# Patient Record
Sex: MALE | Race: WHITE | NOT HISPANIC OR LATINO | URBAN - METROPOLITAN AREA
[De-identification: names, ages, dates, MRNs, and addresses within clinical notes are randomized per-mention and may not be internally consistent; named-entity substitution may affect disease eponyms.]

---

## 2017-12-09 ENCOUNTER — INPATIENT (INPATIENT)
Facility: HOSPITAL | Age: 82
LOS: 5 days | Discharge: NURSING, REHAB OR LONG-TERM CARE | DRG: 177 | End: 2017-12-15
Attending: INTERNAL MEDICINE | Admitting: INTERNAL MEDICINE
Payer: MEDICARE

## 2017-12-09 VITALS
SYSTOLIC BLOOD PRESSURE: 151 MMHG | DIASTOLIC BLOOD PRESSURE: 74 MMHG | OXYGEN SATURATION: 98 % | WEIGHT: 134.26 LBS | HEART RATE: 93 BPM | RESPIRATION RATE: 24 BRPM | TEMPERATURE: 99 F

## 2017-12-09 DIAGNOSIS — G91.1 OBSTRUCTIVE HYDROCEPHALUS: ICD-10-CM

## 2017-12-09 DIAGNOSIS — R63.8 OTHER SYMPTOMS AND SIGNS CONCERNING FOOD AND FLUID INTAKE: ICD-10-CM

## 2017-12-09 DIAGNOSIS — R50.9 FEVER, UNSPECIFIED: ICD-10-CM

## 2017-12-09 DIAGNOSIS — Z98.89 OTHER SPECIFIED POSTPROCEDURAL STATES: Chronic | ICD-10-CM

## 2017-12-09 DIAGNOSIS — Z29.9 ENCOUNTER FOR PROPHYLACTIC MEASURES, UNSPECIFIED: ICD-10-CM

## 2017-12-09 DIAGNOSIS — E11.9 TYPE 2 DIABETES MELLITUS WITHOUT COMPLICATIONS: ICD-10-CM

## 2017-12-09 DIAGNOSIS — E03.9 HYPOTHYROIDISM, UNSPECIFIED: ICD-10-CM

## 2017-12-09 DIAGNOSIS — E78.5 HYPERLIPIDEMIA, UNSPECIFIED: ICD-10-CM

## 2017-12-09 DIAGNOSIS — I10 ESSENTIAL (PRIMARY) HYPERTENSION: ICD-10-CM

## 2017-12-09 DIAGNOSIS — Z93.59 OTHER CYSTOSTOMY STATUS: ICD-10-CM

## 2017-12-09 LAB
ALBUMIN SERPL ELPH-MCNC: 3.2 G/DL — LOW (ref 3.3–5)
ALP SERPL-CCNC: 111 U/L — SIGNIFICANT CHANGE UP (ref 40–120)
ALT FLD-CCNC: 45 U/L — SIGNIFICANT CHANGE UP (ref 10–45)
ANION GAP SERPL CALC-SCNC: 14 MMOL/L — SIGNIFICANT CHANGE UP (ref 5–17)
ANION GAP SERPL CALC-SCNC: 14 MMOL/L — SIGNIFICANT CHANGE UP (ref 5–17)
APPEARANCE UR: CLEAR — SIGNIFICANT CHANGE UP
APTT BLD: 28.5 SEC — SIGNIFICANT CHANGE UP (ref 27.5–37.4)
AST SERPL-CCNC: 53 U/L — HIGH (ref 10–40)
BASE EXCESS BLDV CALC-SCNC: -3.1 MMOL/L — SIGNIFICANT CHANGE UP
BASOPHILS NFR BLD AUTO: 0.1 % — SIGNIFICANT CHANGE UP (ref 0–2)
BILIRUB SERPL-MCNC: 0.5 MG/DL — SIGNIFICANT CHANGE UP (ref 0.2–1.2)
BILIRUB UR-MCNC: NEGATIVE — SIGNIFICANT CHANGE UP
BLD GP AB SCN SERPL QL: NEGATIVE — SIGNIFICANT CHANGE UP
BUN SERPL-MCNC: 13 MG/DL — SIGNIFICANT CHANGE UP (ref 7–23)
BUN SERPL-MCNC: 14 MG/DL — SIGNIFICANT CHANGE UP (ref 7–23)
CALCIUM SERPL-MCNC: 7.7 MG/DL — LOW (ref 8.4–10.5)
CALCIUM SERPL-MCNC: 8.1 MG/DL — LOW (ref 8.4–10.5)
CHLORIDE SERPL-SCNC: 104 MMOL/L — SIGNIFICANT CHANGE UP (ref 96–108)
CHLORIDE SERPL-SCNC: 109 MMOL/L — HIGH (ref 96–108)
CO2 SERPL-SCNC: 17 MMOL/L — LOW (ref 22–31)
CO2 SERPL-SCNC: 20 MMOL/L — LOW (ref 22–31)
COLOR SPEC: YELLOW — SIGNIFICANT CHANGE UP
CREAT SERPL-MCNC: 1.46 MG/DL — HIGH (ref 0.5–1.3)
CREAT SERPL-MCNC: 1.58 MG/DL — HIGH (ref 0.5–1.3)
DIFF PNL FLD: (no result)
EOSINOPHIL NFR BLD AUTO: 0.9 % — SIGNIFICANT CHANGE UP (ref 0–6)
GAS PNL BLDV: SIGNIFICANT CHANGE UP
GLUCOSE BLDC GLUCOMTR-MCNC: 207 MG/DL — HIGH (ref 70–99)
GLUCOSE BLDC GLUCOMTR-MCNC: 225 MG/DL — HIGH (ref 70–99)
GLUCOSE SERPL-MCNC: 130 MG/DL — HIGH (ref 70–99)
GLUCOSE SERPL-MCNC: 196 MG/DL — HIGH (ref 70–99)
GLUCOSE UR QL: NEGATIVE — SIGNIFICANT CHANGE UP
HCO3 BLDV-SCNC: 20 MMOL/L — SIGNIFICANT CHANGE UP (ref 20–27)
HCT VFR BLD CALC: 32.7 % — LOW (ref 39–50)
HGB BLD-MCNC: 10.8 G/DL — LOW (ref 13–17)
INR BLD: 1.26 — HIGH (ref 0.88–1.16)
KETONES UR-MCNC: NEGATIVE — SIGNIFICANT CHANGE UP
LACTATE SERPL-SCNC: 1.7 MMOL/L — SIGNIFICANT CHANGE UP (ref 0.5–2)
LEUKOCYTE ESTERASE UR-ACNC: (no result)
LIDOCAIN IGE QN: 25 U/L — SIGNIFICANT CHANGE UP (ref 7–60)
LYMPHOCYTES # BLD AUTO: 7.9 % — LOW (ref 13–44)
MCHC RBC-ENTMCNC: 28.6 PG — SIGNIFICANT CHANGE UP (ref 27–34)
MCHC RBC-ENTMCNC: 33 G/DL — SIGNIFICANT CHANGE UP (ref 32–36)
MCV RBC AUTO: 86.7 FL — SIGNIFICANT CHANGE UP (ref 80–100)
MONOCYTES NFR BLD AUTO: 2.4 % — SIGNIFICANT CHANGE UP (ref 2–14)
NEUTROPHILS NFR BLD AUTO: 88.7 % — HIGH (ref 43–77)
NITRITE UR-MCNC: NEGATIVE — SIGNIFICANT CHANGE UP
PCO2 BLDV: 30 MMHG — LOW (ref 41–51)
PH BLDV: 7.44 — HIGH (ref 7.32–7.43)
PH UR: 6.5 — SIGNIFICANT CHANGE UP (ref 5–8)
PLATELET # BLD AUTO: 267 K/UL — SIGNIFICANT CHANGE UP (ref 150–400)
PO2 BLDV: 39 MMHG — SIGNIFICANT CHANGE UP
POTASSIUM SERPL-MCNC: 3.6 MMOL/L — SIGNIFICANT CHANGE UP (ref 3.5–5.3)
POTASSIUM SERPL-MCNC: 3.8 MMOL/L — SIGNIFICANT CHANGE UP (ref 3.5–5.3)
POTASSIUM SERPL-SCNC: 3.6 MMOL/L — SIGNIFICANT CHANGE UP (ref 3.5–5.3)
POTASSIUM SERPL-SCNC: 3.8 MMOL/L — SIGNIFICANT CHANGE UP (ref 3.5–5.3)
PROT SERPL-MCNC: 7.3 G/DL — SIGNIFICANT CHANGE UP (ref 6–8.3)
PROT UR-MCNC: 30 MG/DL
PROTHROM AB SERPL-ACNC: 14 SEC — HIGH (ref 9.8–12.7)
RAPID RVP RESULT: SIGNIFICANT CHANGE UP
RBC # BLD: 3.77 M/UL — LOW (ref 4.2–5.8)
RBC # FLD: 14 % — SIGNIFICANT CHANGE UP (ref 10.3–16.9)
RH IG SCN BLD-IMP: POSITIVE — SIGNIFICANT CHANGE UP
SAO2 % BLDV: 71 % — SIGNIFICANT CHANGE UP
SODIUM SERPL-SCNC: 138 MMOL/L — SIGNIFICANT CHANGE UP (ref 135–145)
SODIUM SERPL-SCNC: 140 MMOL/L — SIGNIFICANT CHANGE UP (ref 135–145)
SP GR SPEC: 1.01 — SIGNIFICANT CHANGE UP (ref 1–1.03)
UROBILINOGEN FLD QL: 0.2 E.U./DL — SIGNIFICANT CHANGE UP
WBC # BLD: 9.7 K/UL — SIGNIFICANT CHANGE UP (ref 3.8–10.5)
WBC # FLD AUTO: 9.7 K/UL — SIGNIFICANT CHANGE UP (ref 3.8–10.5)

## 2017-12-09 PROCEDURE — 99222 1ST HOSP IP/OBS MODERATE 55: CPT | Mod: GC

## 2017-12-09 PROCEDURE — 71010: CPT | Mod: 26

## 2017-12-09 PROCEDURE — 93010 ELECTROCARDIOGRAM REPORT: CPT

## 2017-12-09 PROCEDURE — 99285 EMERGENCY DEPT VISIT HI MDM: CPT | Mod: 25

## 2017-12-09 RX ORDER — SODIUM CHLORIDE 9 MG/ML
1000 INJECTION INTRAMUSCULAR; INTRAVENOUS; SUBCUTANEOUS
Qty: 0 | Refills: 0 | Status: DISCONTINUED | OUTPATIENT
Start: 2017-12-09 | End: 2017-12-14

## 2017-12-09 RX ORDER — VANCOMYCIN HCL 1 G
1000 VIAL (EA) INTRAVENOUS ONCE
Qty: 0 | Refills: 0 | Status: COMPLETED | OUTPATIENT
Start: 2017-12-09 | End: 2017-12-09

## 2017-12-09 RX ORDER — ATORVASTATIN CALCIUM 80 MG/1
10 TABLET, FILM COATED ORAL AT BEDTIME
Qty: 0 | Refills: 0 | Status: DISCONTINUED | OUTPATIENT
Start: 2017-12-09 | End: 2017-12-15

## 2017-12-09 RX ORDER — LEVETIRACETAM 250 MG/1
500 TABLET, FILM COATED ORAL
Qty: 0 | Refills: 0 | Status: DISCONTINUED | OUTPATIENT
Start: 2017-12-09 | End: 2017-12-11

## 2017-12-09 RX ORDER — HEPARIN SODIUM 5000 [USP'U]/ML
5000 INJECTION INTRAVENOUS; SUBCUTANEOUS EVERY 8 HOURS
Qty: 0 | Refills: 0 | Status: DISCONTINUED | OUTPATIENT
Start: 2017-12-09 | End: 2017-12-15

## 2017-12-09 RX ORDER — LEVOTHYROXINE SODIUM 125 MCG
25 TABLET ORAL DAILY
Qty: 0 | Refills: 0 | Status: DISCONTINUED | OUTPATIENT
Start: 2017-12-09 | End: 2017-12-09

## 2017-12-09 RX ORDER — KETOROLAC TROMETHAMINE 30 MG/ML
15 SYRINGE (ML) INJECTION ONCE
Qty: 0 | Refills: 0 | Status: DISCONTINUED | OUTPATIENT
Start: 2017-12-09 | End: 2017-12-09

## 2017-12-09 RX ORDER — METOPROLOL TARTRATE 50 MG
100 TABLET ORAL
Qty: 0 | Refills: 0 | Status: DISCONTINUED | OUTPATIENT
Start: 2017-12-09 | End: 2017-12-11

## 2017-12-09 RX ORDER — SODIUM CHLORIDE 9 MG/ML
500 INJECTION INTRAMUSCULAR; INTRAVENOUS; SUBCUTANEOUS
Qty: 0 | Refills: 0 | Status: COMPLETED | OUTPATIENT
Start: 2017-12-09 | End: 2017-12-09

## 2017-12-09 RX ORDER — DEXTROSE 50 % IN WATER 50 %
25 SYRINGE (ML) INTRAVENOUS ONCE
Qty: 0 | Refills: 0 | Status: DISCONTINUED | OUTPATIENT
Start: 2017-12-09 | End: 2017-12-15

## 2017-12-09 RX ORDER — PIPERACILLIN AND TAZOBACTAM 4; .5 G/20ML; G/20ML
3.38 INJECTION, POWDER, LYOPHILIZED, FOR SOLUTION INTRAVENOUS ONCE
Qty: 0 | Refills: 0 | Status: COMPLETED | OUTPATIENT
Start: 2017-12-09 | End: 2017-12-09

## 2017-12-09 RX ORDER — ACETAMINOPHEN 500 MG
650 TABLET ORAL EVERY 6 HOURS
Qty: 0 | Refills: 0 | Status: DISCONTINUED | OUTPATIENT
Start: 2017-12-09 | End: 2017-12-15

## 2017-12-09 RX ORDER — DEXTROSE 50 % IN WATER 50 %
1 SYRINGE (ML) INTRAVENOUS ONCE
Qty: 0 | Refills: 0 | Status: DISCONTINUED | OUTPATIENT
Start: 2017-12-09 | End: 2017-12-15

## 2017-12-09 RX ORDER — SODIUM CHLORIDE 9 MG/ML
1000 INJECTION, SOLUTION INTRAVENOUS
Qty: 0 | Refills: 0 | Status: DISCONTINUED | OUTPATIENT
Start: 2017-12-09 | End: 2017-12-15

## 2017-12-09 RX ORDER — GLUCAGON INJECTION, SOLUTION 0.5 MG/.1ML
1 INJECTION, SOLUTION SUBCUTANEOUS ONCE
Qty: 0 | Refills: 0 | Status: DISCONTINUED | OUTPATIENT
Start: 2017-12-09 | End: 2017-12-15

## 2017-12-09 RX ORDER — INSULIN LISPRO 100/ML
VIAL (ML) SUBCUTANEOUS
Qty: 0 | Refills: 0 | Status: DISCONTINUED | OUTPATIENT
Start: 2017-12-09 | End: 2017-12-15

## 2017-12-09 RX ORDER — AMLODIPINE BESYLATE 2.5 MG/1
5 TABLET ORAL DAILY
Qty: 0 | Refills: 0 | Status: DISCONTINUED | OUTPATIENT
Start: 2017-12-09 | End: 2017-12-15

## 2017-12-09 RX ORDER — ACETAMINOPHEN 500 MG
650 TABLET ORAL ONCE
Qty: 0 | Refills: 0 | Status: COMPLETED | OUTPATIENT
Start: 2017-12-09 | End: 2017-12-09

## 2017-12-09 RX ORDER — DEXTROSE 50 % IN WATER 50 %
12.5 SYRINGE (ML) INTRAVENOUS ONCE
Qty: 0 | Refills: 0 | Status: DISCONTINUED | OUTPATIENT
Start: 2017-12-09 | End: 2017-12-15

## 2017-12-09 RX ORDER — ASPIRIN/CALCIUM CARB/MAGNESIUM 324 MG
81 TABLET ORAL DAILY
Qty: 0 | Refills: 0 | Status: DISCONTINUED | OUTPATIENT
Start: 2017-12-09 | End: 2017-12-15

## 2017-12-09 RX ORDER — LEVOTHYROXINE SODIUM 125 MCG
50 TABLET ORAL DAILY
Qty: 0 | Refills: 0 | Status: DISCONTINUED | OUTPATIENT
Start: 2017-12-09 | End: 2017-12-11

## 2017-12-09 RX ADMIN — SODIUM CHLORIDE 2000 MILLILITER(S): 9 INJECTION INTRAMUSCULAR; INTRAVENOUS; SUBCUTANEOUS at 12:40

## 2017-12-09 RX ADMIN — SODIUM CHLORIDE 2000 MILLILITER(S): 9 INJECTION INTRAMUSCULAR; INTRAVENOUS; SUBCUTANEOUS at 13:00

## 2017-12-09 RX ADMIN — SODIUM CHLORIDE 2000 MILLILITER(S): 9 INJECTION INTRAMUSCULAR; INTRAVENOUS; SUBCUTANEOUS at 12:30

## 2017-12-09 RX ADMIN — SODIUM CHLORIDE 75 MILLILITER(S): 9 INJECTION INTRAMUSCULAR; INTRAVENOUS; SUBCUTANEOUS at 15:46

## 2017-12-09 RX ADMIN — SODIUM CHLORIDE 2000 MILLILITER(S): 9 INJECTION INTRAMUSCULAR; INTRAVENOUS; SUBCUTANEOUS at 12:50

## 2017-12-09 RX ADMIN — AMLODIPINE BESYLATE 5 MILLIGRAM(S): 2.5 TABLET ORAL at 20:12

## 2017-12-09 RX ADMIN — Medication: at 22:09

## 2017-12-09 RX ADMIN — Medication 250 MILLIGRAM(S): at 13:20

## 2017-12-09 RX ADMIN — Medication 15 MILLIGRAM(S): at 12:52

## 2017-12-09 RX ADMIN — PIPERACILLIN AND TAZOBACTAM 200 GRAM(S): 4; .5 INJECTION, POWDER, LYOPHILIZED, FOR SOLUTION INTRAVENOUS at 12:52

## 2017-12-09 RX ADMIN — ATORVASTATIN CALCIUM 10 MILLIGRAM(S): 80 TABLET, FILM COATED ORAL at 22:09

## 2017-12-09 RX ADMIN — Medication 650 MILLIGRAM(S): at 12:52

## 2017-12-09 RX ADMIN — LEVETIRACETAM 500 MILLIGRAM(S): 250 TABLET, FILM COATED ORAL at 20:12

## 2017-12-09 RX ADMIN — HEPARIN SODIUM 5000 UNIT(S): 5000 INJECTION INTRAVENOUS; SUBCUTANEOUS at 22:09

## 2017-12-09 RX ADMIN — Medication 81 MILLIGRAM(S): at 20:12

## 2017-12-09 RX ADMIN — Medication 100 MILLIGRAM(S): at 22:10

## 2017-12-09 NOTE — H&P ADULT - PROBLEM SELECTOR PLAN 2
Pt febrile in the ED to 104. Did not meet SIRS criteria. UA was positive but given pt w/ chronic suprapubic catheter and hx of positive UA on previous admission will not rule out other etiologies. Concern for possible aspiration pneumonia given pt w/ episode of coffee ground emesis and diffuse rhonchi on lung exam.   -f/u blood cultures  -f/u urine cultures  -f/u RVP Pt febrile in the ED to 104. Did not meet SIRS criteria. UA was positive but given pt w/ chronic suprapubic catheter and hx of positive UA on previous admission will not rule out other etiologies. Concern for possible aspiration pneumonia given pt w/ episode of coffee ground emesis and diffuse rhonchi on lung exam.   -f/u blood cultures  -f/u urine cultures  -f/u RVP  -will continue w/ vanc + zosyn for now Pt febrile in the ED to 104. Did not meet SIRS criteria. UA was positive but given pt w/ chronic suprapubic catheter and hx of positive UA on previous admission will not rule out other etiologies. Concern for possible aspiration pneumonia given pt w/ episode of coffee ground emesis, diffuse rhonchi and gargling noises on exam, and hx of dysphagia.   -f/u blood cultures  -f/u urine cultures  -f/u RVP  -will continue w/ vanc + zosyn for now  -f/u repeat chest xray in AM

## 2017-12-09 NOTE — H&P ADULT - HISTORY OF PRESENT ILLNESS
Patient is a 83 year old male with past medical history of           In the ED Vitals were TMax 104, HR 89-98, -151/70-97, RR 18-24, SpO2 98% on 2L NC. Labs Notable for Anemia (Hemoglobin 10.8), No Leukocytosis but with Elevated PMNs 88.7%. Decreased Bicarbonate 20, Elevated Creatinine 1.58, Mildly Elevated AST, Urinalysis Consistent with UTI. CXR without Acute Infiltrate. No EKG Performed. Patient given Vancomycin 1g IV x 1, Zosyn 3.375g IV x 1., NS 500mL IV x 1. Blood Cultures, Urine Culture Obtained. Patient is a 83 year old male with past medical history of obstructive hydrocephalus, dementia (AAOx0), dysphagia, CKD, urinary retention w/ suprapubic catheter, DM, hypothyroidism, HTN, HLD, seizure disorder who was brought in from NH after having what is reported to be one episode of medium sized coffee ground vomitus and fever. Pt was previously admitted to [facility] in 2016 for concerns of aspiration pneumonia and UTI but found to have contaminated urine cultures and treatment was discontinued. Of note, the patient is nonverbal at baseline.     In the ED Vitals were TMax 104, HR 89-98, -151/70-97, RR 18-24, SpO2 98% on 2L NC. Labs Notable for Anemia (Hemoglobin 10.8), No Leukocytosis but with Elevated PMNs 88.7%. Decreased Bicarbonate 20, Elevated Creatinine 1.58, Mildly Elevated AST, Urinalysis Consistent with UTI. CXR without Acute Infiltrate. No EKG Performed. Patient given Vancomycin 1g IV x 1, Zosyn 3.375g IV x 1., NS 500mL IV x 1. Blood Cultures, Urine Culture Obtained.

## 2017-12-09 NOTE — H&P ADULT - PROBLEM SELECTOR PLAN 3
Pt w/ suprapubic indwelling urinary catheter found to have a positive UA. Pt febrile in the ED to 104 likely secondary to UTI. S/p 1 dose of Vanc + Zosyn in the ED.   - Pt w/ suprapubic indwelling urinary catheter found to have a positive UA. Pt febrile in the ED to 104 likely secondary to UTI. S/p 1 dose of Vanc + Zosyn in the ED.   -c/w suprapubic catheter

## 2017-12-09 NOTE — H&P ADULT - NSHPPHYSICALEXAM_GEN_ALL_CORE
Vital Signs Last 24 Hrs  T(C): 38.6 (09 Dec 2017 14:11), Max: 40 (09 Dec 2017 12:30)  T(F): 101.5 (09 Dec 2017 14:11), Max: 104 (09 Dec 2017 12:30)  HR: 89 (09 Dec 2017 14:11) (89 - 98)  BP: 139/70 (09 Dec 2017 14:11) (139/70 - 151/97)  BP(mean): --  RR: 18 (09 Dec 2017 14:11) (18 - 24)  SpO2: 98% (09 Dec 2017 14:11) (98% - 98%) .  VITAL SIGNS:  T(C): 36.7 (12-09-17 @ 15:10), Max: 40 (12-09-17 @ 12:30)  T(F): 98 (12-09-17 @ 15:10), Max: 104 (12-09-17 @ 12:30)  HR: 86 (12-09-17 @ 15:10) (86 - 98)  BP: 165/97 (12-09-17 @ 15:10) (139/70 - 165/97)  BP(mean): --  RR: 18 (12-09-17 @ 15:10) (18 - 24)  SpO2: 96% (12-09-17 @ 15:10) (96% - 98%)  Wt(kg): --    PHYSICAL EXAM:    Constitutional: Elderly male laying in bed comfortably.   Head: NC/AT  Eyes: PERRL, anicteric sclera, no conjunctival pallor noted.   Neck: supple; no JVD or thyromegaly  Respiratory: Diffuse rhonchi b/l upper and lower lung fields. Pt noted to be making gargling noises. Breathing comfortably. No accessory muscle use.   Cardiac: +S1/S2; RRR; no M/R/G; PMI non-displaced  Gastrointestinal: abdomen soft, NT/ND; no rebound or guarding; +BSx4  Genitourinary: normal external genitalia  Extremities: WWP, no clubbing or cyanosis; no peripheral edema  Vascular: 2+ radial, femoral, DP/PT pulses B/L  Neurologic: AAOx0. Unable to follow commands but pt is moving all 4 extremities spontaneously.

## 2017-12-09 NOTE — H&P ADULT - ATTENDING COMMENTS
Patient seen and examined, I agree with the above assessment and plan.     Patient is a 83 year old male with past medical history of obstructive hydrocephalus, dementia (AAOx0), dysphagia, CKD, urinary retention w/ suprapubic catheter, DM, hypothyroidism, HTN, HLD, seizure disorder who was brought in from NH after having what is reported to be one episode of medium sized coffee ground vomitus and fever. Pt was previously admitted to [facility] in 2016 for concerns of aspiration pneumonia and UTI but found to have contaminated urine cultures and treatment was discontinued. Of note, the patient is nonverbal at baseline.     #fever - patient unable to localize symptos. in setting of recent vomiting, likely aspiraiton pna +/- UTI (likely has chronic colonization)  -broad spectrum abx as above    #?coffee ground emesis - not seen on admission, no melena, no BUN elevation  -daily CBC    #HTN - hypertensive on admission, cont meds, but hold hydralazine    #DM management as above

## 2017-12-09 NOTE — ED ADULT TRIAGE NOTE - OTHER COMPLAINTS
Pt BIBA from [facility] for fever. As per EMS, pt had fever of 103 and dark color vomiting earlier today. Pt is awake and non-verbal. As per EMS, no change in mental status.

## 2017-12-09 NOTE — ED PROVIDER NOTE - OBJECTIVE STATEMENT
82 yo M with PMHx of obstructive hydrocephalus, dementia (seen by Dr. 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 No documentation or report from NH regarding other sx (cough, vomiting, change in BMs). Due to advanced dementia pt is unable to provide further history.

## 2017-12-09 NOTE — ED ADULT NURSE NOTE - OTHER COMPLAINTS
Pt BIBA from [facility] for fever. As per EMS, pt had fever of 103 and dark color vomiting earlier today. Pt is awake and non-verbal. As per EMS, no change in mental status.

## 2017-12-09 NOTE — H&P ADULT - PROBLEM SELECTOR PLAN 1
Pt reported to have one episode of medium sized coffee ground vomitus prior to coming to [facility] from NH. As per NH records pt w/ a Hgb of 12.7 on 12/8 now presenting w/ a Hgb of 10.8. Rectal exam performed but no stool noted in the rectal vault.   -repeat CBC @8pm   -will maintain active type and screen   -will discuss case w/ GI Pt reported to have one episode of medium sized coffee ground vomitus prior to coming to [facility] from NH. As per NH records pt w/ a Hgb of 12.7 on 12/8 now presenting w/ a Hgb of 10.8. Rectal exam performed but no stool noted in the rectal vault. Given pt is hemodynamically stable low concern for active bleed at this moment, will need to rule out infectious etiology first.   -repeat CBC @10pm   -will maintain active type and screen   -will transfuse if Hgb <7

## 2017-12-09 NOTE — H&P ADULT - PROBLEM SELECTOR PLAN 8
-ISS   -will increase as needed. -ISS   -will increase as needed.  -Pt takes Levemir 15 units in AM and 30units at bedtime and Humalog 5units premeal

## 2017-12-09 NOTE — ED ADULT NURSE NOTE - PMH
Chronic kidney disease  Chronic kidney disease (CKD)  Dementia    Dysphagia    Epilepsy    Essential hypertension  HTN (hypertension)  HLD (hyperlipidemia)    Hypokalemia    Hypothyroidism    Muscle weakness (generalized)    Obstructive hydrocephalus  Hydrocephalus  Type 2 diabetes mellitus  DM (diabetes mellitus)  UTI (urinary tract infection)    Vitamin D deficiency

## 2017-12-09 NOTE — ED ADULT NURSE NOTE - OBJECTIVE STATEMENT
83y M, nonverbal, presents via EMS from [facility] for fever and x1 episode of coffee ground emesis. Pt nonverbal indicators of pain not present. Noted suprapubic catheter to abdomen. Skin intact, Noted Heplock from nursing home to right hand. Diminished lung sounds bilateral. Pt responds to verbal and tactile stimuli. EKG performed, Labs drawn. Will continue to monitor.

## 2017-12-09 NOTE — ED PROVIDER NOTE - MEDICAL DECISION MAKING DETAILS
+ UTI w/high fever in NH patient. Does not meet severe sepsis criteria though initiating broad spectrum abx for concern of catheter associated infection. Cx sent. VSS. Will admit for IV abx pending culture results.

## 2017-12-09 NOTE — ED PROVIDER NOTE - PHYSICAL EXAMINATION
VITAL SIGNS: I have reviewed nursing notes and confirm.  CONSTITUTIONAL: Elderly man lying contracted in stretcher awake though not following commands, appears to recognize name, non-verbal, no evidence of air hunger, NAD  SKIN: Agree with RN documentation regarding decubitus evaluation. Remainder of skin exam is warm and dry, no acute rash.  HEAD: Normocephalic; atraumatic.  EYES: PERRL, EOM intact; conjunctiva and sclera clear.  ENT: No nasal discharge; airway clear, + dental caries  NECK: Supple; non tender.  CARD: S1, S2 normal; no murmurs, gallops, or rubs. RRR  RESP: CTA b/l w/moderate excursion, no tachypnea or inc wob  ABD: Normal bowel sounds; soft; NTND, + suprapubic catheter in place w/out skin breakdown or purulent discharge  : catheter as above, draining yellow urine mildly cloudy  EXT: Contracted all 4 ext, non-ttp, no significant effusions or erythema/swelling.  LYMPH: No acute cervical adenopathy.  NEURO: looking around room, tracking eyes, purposeful movements b/l, no clear facial asymmetry

## 2017-12-09 NOTE — H&P ADULT - NSHPLABSRESULTS_GEN_ALL_CORE
CBC Full  -  ( 09 Dec 2017 12:47 )  WBC Count : 9.7 K/uL  Hemoglobin : 10.8 g/dL  Hematocrit : 32.7 %  Platelet Count - Automated : 267 K/uL  Mean Cell Volume : 86.7 fL  Mean Cell Hemoglobin : 28.6 pg  Mean Cell Hemoglobin Concentration : 33.0 g/dL  Auto Neutrophil # : x  Auto Lymphocyte # : x  Auto Monocyte # : x  Auto Eosinophil # : x  Auto Basophil # : x  Auto Neutrophil % : 88.7 %  Auto Lymphocyte % : 7.9 %  Auto Monocyte % : 2.4 %  Auto Eosinophil % : 0.9 %  Auto Basophil % : 0.1 %    12-09    138  |  104  |  14  ----------------------------<  130<H>  3.6   |  20<L>  |  1.58<H>    Ca    8.1<L>      09 Dec 2017 12:48    TPro  7.3  /  Alb  3.2<L>  /  TBili  0.5  /  DBili  x   /  AST  53<H>  /  ALT  45  /  AlkPhos  111  12-09    Lipase, Serum: 25 U/L (12.09.17 @ 12:48)    Lactate, Blood: 1.7 mmoL/L (12.09.17 @ 12:47)    Blood Gas Profile - Venous (12.09.17 @ 13:01)    pH, Venous: 7.44    pCO2, Venous: 30 mmHg    pO2, Venous: 39 mmHg    HCO3, Venous: 20 mmol/L    Base Excess, Venous: -3.1 mmol/L    Oxygen Saturation, Venous: 71 %    Blood Gas Source Venous: BLDA    < from: Xray Chest 1 View AP -PORTABLE-Routine (12.09.17 @ 13:28) >    XAM:  XR CHEST 1 VIEW PORT ROUTINE                          PROCEDURE DATE:  12/09/2017       INTERPRETATION:  Clinical History: Sepsis    Portable examination the chest demonstrates the heart to be within normal   limits in transverse diameter. Patient's head obscures the right upper   lung field. Prominent bronchovascular markings. Right basilar infiltrates   cannot be excluded.    Impression: Prominent bronchovascular markings. Right basilar infiltrates   cannot be excluded      "Thank you for the opportunity to participate in the care of this   patient."    SANAM GARRETT M.D., ATTENDING RADIOLOGIST  This document has been electronically signed. Dec  9 2017  1:34PM

## 2017-12-09 NOTE — ED PROVIDER NOTE - CARE PLAN
Principal Discharge DX:	Urinary tract infection associated with indwelling urethral catheter, initial encounter

## 2017-12-09 NOTE — ED ADULT NURSE NOTE - NURSING NEURO LEVEL OF CONSCIOUSNESS
Cervical disc displacement  05/25/2017    Active  Aurelio Luke
responsd to verbal and tactile stimuli

## 2017-12-09 NOTE — H&P ADULT - ASSESSMENT
Patient is an 83 year old male with past medical history of Patient is an 83 year old male with past medical history of obstructive hydrocephalus, dementia, dysphagia, CKD, urinary retention w/ suprapubic catheter, DM, hypothyroidism, HTN, HLD, seizure disorder. Patient is an 83 year old male with past medical history of obstructive hydrocephalus, dementia, dysphagia, CKD, urinary retention w/ suprapubic catheter, DM, hypothyroidism, HTN, HLD, seizure disorder admitted for episode of coffee ground episode w/ associated fevers.

## 2017-12-10 DIAGNOSIS — N30.00 ACUTE CYSTITIS WITHOUT HEMATURIA: ICD-10-CM

## 2017-12-10 DIAGNOSIS — D50.0 IRON DEFICIENCY ANEMIA SECONDARY TO BLOOD LOSS (CHRONIC): ICD-10-CM

## 2017-12-10 LAB
ALBUMIN SERPL ELPH-MCNC: 2.8 G/DL — LOW (ref 3.3–5)
ALP SERPL-CCNC: 96 U/L — SIGNIFICANT CHANGE UP (ref 40–120)
ALT FLD-CCNC: 36 U/L — SIGNIFICANT CHANGE UP (ref 10–45)
ANION GAP SERPL CALC-SCNC: 16 MMOL/L — SIGNIFICANT CHANGE UP (ref 5–17)
AST SERPL-CCNC: 51 U/L — HIGH (ref 10–40)
BASOPHILS NFR BLD AUTO: 0 % — SIGNIFICANT CHANGE UP (ref 0–2)
BILIRUB SERPL-MCNC: 0.7 MG/DL — SIGNIFICANT CHANGE UP (ref 0.2–1.2)
BUN SERPL-MCNC: 14 MG/DL — SIGNIFICANT CHANGE UP (ref 7–23)
CALCIUM SERPL-MCNC: 7.9 MG/DL — LOW (ref 8.4–10.5)
CHLORIDE SERPL-SCNC: 107 MMOL/L — SIGNIFICANT CHANGE UP (ref 96–108)
CO2 SERPL-SCNC: 18 MMOL/L — LOW (ref 22–31)
CREAT SERPL-MCNC: 1.48 MG/DL — HIGH (ref 0.5–1.3)
EOSINOPHIL NFR BLD AUTO: 3 % — SIGNIFICANT CHANGE UP (ref 0–6)
FERRITIN SERPL-MCNC: 325.6 NG/ML — SIGNIFICANT CHANGE UP (ref 30–400)
GLUCOSE BLDC GLUCOMTR-MCNC: 107 MG/DL — HIGH (ref 70–99)
GLUCOSE BLDC GLUCOMTR-MCNC: 156 MG/DL — HIGH (ref 70–99)
GLUCOSE BLDC GLUCOMTR-MCNC: 174 MG/DL — HIGH (ref 70–99)
GLUCOSE BLDC GLUCOMTR-MCNC: 98 MG/DL — SIGNIFICANT CHANGE UP (ref 70–99)
GLUCOSE SERPL-MCNC: 106 MG/DL — HIGH (ref 70–99)
HCT VFR BLD CALC: 33 % — LOW (ref 39–50)
HGB BLD-MCNC: 9.6 G/DL — LOW (ref 13–17)
IRON SATN MFR SERPL: 11 % — LOW (ref 16–55)
IRON SATN MFR SERPL: 19 UG/DL — LOW (ref 45–165)
LYMPHOCYTES # BLD AUTO: 7 % — LOW (ref 13–44)
MAGNESIUM SERPL-MCNC: 1.8 MG/DL — SIGNIFICANT CHANGE UP (ref 1.6–2.6)
MCHC RBC-ENTMCNC: 25.9 PG — LOW (ref 27–34)
MCHC RBC-ENTMCNC: 29.1 G/DL — LOW (ref 32–36)
MCV RBC AUTO: 88.9 FL — SIGNIFICANT CHANGE UP (ref 80–100)
MONOCYTES NFR BLD AUTO: 2 % — SIGNIFICANT CHANGE UP (ref 2–14)
NEUTROPHILS NFR BLD AUTO: 77 % — SIGNIFICANT CHANGE UP (ref 43–77)
PHOSPHATE SERPL-MCNC: 1.9 MG/DL — LOW (ref 2.5–4.5)
PLATELET # BLD AUTO: 184 K/UL — SIGNIFICANT CHANGE UP (ref 150–400)
POTASSIUM SERPL-MCNC: 3.7 MMOL/L — SIGNIFICANT CHANGE UP (ref 3.5–5.3)
POTASSIUM SERPL-SCNC: 3.7 MMOL/L — SIGNIFICANT CHANGE UP (ref 3.5–5.3)
PROT SERPL-MCNC: 6.6 G/DL — SIGNIFICANT CHANGE UP (ref 6–8.3)
RBC # BLD: 3.71 M/UL — LOW (ref 4.2–5.8)
RBC # FLD: 14.2 % — SIGNIFICANT CHANGE UP (ref 10.3–16.9)
SODIUM SERPL-SCNC: 141 MMOL/L — SIGNIFICANT CHANGE UP (ref 135–145)
TIBC SERPL-MCNC: 171 UG/DL — LOW (ref 220–430)
TSH SERPL-MCNC: 1.94 UIU/ML — SIGNIFICANT CHANGE UP (ref 0.35–4.94)
UIBC SERPL-MCNC: 152 UG/DL — SIGNIFICANT CHANGE UP (ref 110–370)
WBC # BLD: 12 K/UL — HIGH (ref 3.8–10.5)
WBC # FLD AUTO: 12 K/UL — HIGH (ref 3.8–10.5)

## 2017-12-10 PROCEDURE — 71010: CPT | Mod: 26

## 2017-12-10 PROCEDURE — 99233 SBSQ HOSP IP/OBS HIGH 50: CPT | Mod: GC

## 2017-12-10 RX ORDER — VANCOMYCIN HCL 1 G
1000 VIAL (EA) INTRAVENOUS EVERY 24 HOURS
Qty: 0 | Refills: 0 | Status: DISCONTINUED | OUTPATIENT
Start: 2017-12-11 | End: 2017-12-11

## 2017-12-10 RX ORDER — PIPERACILLIN AND TAZOBACTAM 4; .5 G/20ML; G/20ML
2.25 INJECTION, POWDER, LYOPHILIZED, FOR SOLUTION INTRAVENOUS EVERY 6 HOURS
Qty: 0 | Refills: 0 | Status: DISCONTINUED | OUTPATIENT
Start: 2017-12-10 | End: 2017-12-11

## 2017-12-10 RX ORDER — VANCOMYCIN HCL 1 G
VIAL (EA) INTRAVENOUS
Qty: 0 | Refills: 0 | Status: DISCONTINUED | OUTPATIENT
Start: 2017-12-10 | End: 2017-12-11

## 2017-12-10 RX ORDER — PIPERACILLIN AND TAZOBACTAM 4; .5 G/20ML; G/20ML
3.38 INJECTION, POWDER, LYOPHILIZED, FOR SOLUTION INTRAVENOUS EVERY 8 HOURS
Qty: 0 | Refills: 0 | Status: DISCONTINUED | OUTPATIENT
Start: 2017-12-10 | End: 2017-12-10

## 2017-12-10 RX ORDER — VANCOMYCIN HCL 1 G
1000 VIAL (EA) INTRAVENOUS ONCE
Qty: 0 | Refills: 0 | Status: COMPLETED | OUTPATIENT
Start: 2017-12-10 | End: 2017-12-10

## 2017-12-10 RX ORDER — PANTOPRAZOLE SODIUM 20 MG/1
40 TABLET, DELAYED RELEASE ORAL
Qty: 0 | Refills: 0 | Status: DISCONTINUED | OUTPATIENT
Start: 2017-12-10 | End: 2017-12-14

## 2017-12-10 RX ORDER — VANCOMYCIN HCL 1 G
VIAL (EA) INTRAVENOUS
Qty: 0 | Refills: 0 | Status: DISCONTINUED | OUTPATIENT
Start: 2017-12-10 | End: 2017-12-10

## 2017-12-10 RX ORDER — POTASSIUM PHOSPHATE, MONOBASIC POTASSIUM PHOSPHATE, DIBASIC 236; 224 MG/ML; MG/ML
30 INJECTION, SOLUTION INTRAVENOUS ONCE
Qty: 0 | Refills: 0 | Status: COMPLETED | OUTPATIENT
Start: 2017-12-10 | End: 2017-12-10

## 2017-12-10 RX ADMIN — PIPERACILLIN AND TAZOBACTAM 200 GRAM(S): 4; .5 INJECTION, POWDER, LYOPHILIZED, FOR SOLUTION INTRAVENOUS at 17:19

## 2017-12-10 RX ADMIN — HEPARIN SODIUM 5000 UNIT(S): 5000 INJECTION INTRAVENOUS; SUBCUTANEOUS at 07:26

## 2017-12-10 RX ADMIN — Medication 1: at 18:19

## 2017-12-10 RX ADMIN — PIPERACILLIN AND TAZOBACTAM 200 GRAM(S): 4; .5 INJECTION, POWDER, LYOPHILIZED, FOR SOLUTION INTRAVENOUS at 11:24

## 2017-12-10 RX ADMIN — HEPARIN SODIUM 5000 UNIT(S): 5000 INJECTION INTRAVENOUS; SUBCUTANEOUS at 22:08

## 2017-12-10 RX ADMIN — PANTOPRAZOLE SODIUM 40 MILLIGRAM(S): 20 TABLET, DELAYED RELEASE ORAL at 17:20

## 2017-12-10 RX ADMIN — LEVETIRACETAM 500 MILLIGRAM(S): 250 TABLET, FILM COATED ORAL at 18:51

## 2017-12-10 RX ADMIN — PIPERACILLIN AND TAZOBACTAM 200 GRAM(S): 4; .5 INJECTION, POWDER, LYOPHILIZED, FOR SOLUTION INTRAVENOUS at 23:42

## 2017-12-10 RX ADMIN — POTASSIUM PHOSPHATE, MONOBASIC POTASSIUM PHOSPHATE, DIBASIC 85 MILLIMOLE(S): 236; 224 INJECTION, SOLUTION INTRAVENOUS at 12:22

## 2017-12-10 RX ADMIN — Medication 250 MILLIGRAM(S): at 15:42

## 2017-12-10 RX ADMIN — HEPARIN SODIUM 5000 UNIT(S): 5000 INJECTION INTRAVENOUS; SUBCUTANEOUS at 14:08

## 2017-12-10 RX ADMIN — Medication 81 MILLIGRAM(S): at 11:25

## 2017-12-10 RX ADMIN — Medication 100 MILLIGRAM(S): at 18:52

## 2017-12-10 RX ADMIN — Medication 1: at 22:08

## 2017-12-10 NOTE — PROGRESS NOTE ADULT - PROBLEM SELECTOR PLAN 8
-ISS   -will increase as needed.  -Pt takes Levemir 15 units in AM and 30units at bedtime and Humalog 5units premeal

## 2017-12-10 NOTE — PROGRESS NOTE ADULT - PROBLEM SELECTOR PLAN 2
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

## 2017-12-10 NOTE — PROGRESS NOTE ADULT - SUBJECTIVE AND OBJECTIVE BOX
83M non-verbal with obstructive hydrocephalus, dementia, dysphagia, CKD, urinary retention w/ suprapubic catheter, DM, Hypothyroid, HTN, HLD, seizure disorder presenting with episode of coffee ground emesis and admitted for possible UGIB, treatment of acute cystitis, and potential of aspiration PNA    O/N Events: KELVIN overnight  Subjective/ROS: Patient is non verbal unable to assess subjective or ROS this morning.    VITALS  Vital Signs Last 24 Hrs  T(C): 36.8 (10 Dec 2017 08:48), Max: 40 (09 Dec 2017 12:30)  T(F): 98.2 (10 Dec 2017 08:48), Max: 104 (09 Dec 2017 12:30)  HR: 74 (10 Dec 2017 08:48) (68 - 98)  BP: 135/76 (10 Dec 2017 08:48) (121/83 - 191/93)  BP(mean): --  RR: 18 (10 Dec 2017 08:48) (18 - 24)  SpO2: 96% (10 Dec 2017 08:48) (96% - 99%)    CAPILLARY BLOOD GLUCOSE      POCT Blood Glucose.: 107 mg/dL (10 Dec 2017 08:04)  POCT Blood Glucose.: 207 mg/dL (09 Dec 2017 22:40)  POCT Blood Glucose.: 225 mg/dL (09 Dec 2017 22:01)      PHYSICAL EXAM  General: A&Ox3; NAD; lying in bed comfortably  Head: NC/AT; MMM; PERRL; EOMI;  Neck: Supple; no JVD  Respiratory: moving air, patient making noises during auscultation, potential for coarse rhonchi heard bilaterally  Cardiovascular: Regular rhythm/rate; S1/S2   Gastrointestinal: Soft; ND; normoactive BS; patient attempted to cover his abdomen during exam, no signs of tenderness  Extremities: WWP; no edema/cyanosis  Neurological:  CNII-XII grossly intact; no obvious focal deficits    MEDICATIONS  (STANDING):  amLODIPine   Tablet 5 milliGRAM(s) Oral daily  aspirin enteric coated 81 milliGRAM(s) Oral daily  atorvastatin 10 milliGRAM(s) Oral at bedtime  dextrose 5%. 1000 milliLiter(s) (50 mL/Hr) IV Continuous <Continuous>  dextrose 50% Injectable 12.5 Gram(s) IV Push once  dextrose 50% Injectable 25 Gram(s) IV Push once  dextrose 50% Injectable 25 Gram(s) IV Push once  heparin  Injectable 5000 Unit(s) SubCutaneous every 8 hours  insulin lispro (HumaLOG) corrective regimen sliding scale   SubCutaneous Before meals and at bedtime  levETIRAcetam 500 milliGRAM(s) Oral two times a day  levothyroxine 50 MICROGram(s) Oral daily  metoprolol     tartrate 100 milliGRAM(s) Oral two times a day  pantoprazole  Injectable 40 milliGRAM(s) IV Push two times a day  piperacillin/tazobactam IVPB. 2.25 Gram(s) IV Intermittent every 6 hours  potassium phosphate IVPB 30 milliMole(s) IV Intermittent once  sodium chloride 0.9%. 1000 milliLiter(s) (75 mL/Hr) IV Continuous <Continuous>    MEDICATIONS  (PRN):  acetaminophen   Tablet 650 milliGRAM(s) Oral every 6 hours PRN For Temp greater than 38 C (100.4 F)  dextrose Gel 1 Dose(s) Oral once PRN Blood Glucose LESS THAN 70 milliGRAM(s)/deciliter  glucagon  Injectable 1 milliGRAM(s) IntraMuscular once PRN Glucose LESS THAN 70 milligrams/deciliter      No Known Allergies      LABS                        9.6    12.0  )-----------( 184      ( 10 Dec 2017 08:29 )             33.0     12-10    141  |  107  |  14  ----------------------------<  106<H>  3.7   |  18<L>  |  1.48<H>    Ca    7.9<L>      10 Dec 2017 08:27  Phos  1.9     12-10  Mg     1.8     12-10    TPro  6.6  /  Alb  2.8<L>  /  TBili  0.7  /  DBili  x   /  AST  51<H>  /  ALT  36  /  AlkPhos  96  12-10    PT/INR - ( 09 Dec 2017 12:47 )   PT: 14.0 sec;   INR: 1.26          PTT - ( 09 Dec 2017 12:47 )  PTT:28.5 sec  Urinalysis Basic - ( 09 Dec 2017 13:12 )    Color: Yellow / Appearance: Clear / S.010 / pH: x  Gluc: x / Ketone: NEGATIVE  / Bili: Negative / Urobili: 0.2 E.U./dL   Blood: x / Protein: 30 mg/dL / Nitrite: NEGATIVE   Leuk Esterase: Small / RBC: 5-10 /HPF / WBC > 10 /HPF   Sq Epi: x / Non Sq Epi: x / Bacteria: Present /HPF    Culture - Urine (17 @ 14:32)    Specimen Source: .Urine Clean Catch (Midstream)    Culture Results:   45,000 CFU/ml Enterococcus species  Identification and susceptibility to follow.    Xray Chest 1 View AP -PORTABLE-Routine (17 @ 13:28)  Impression: Prominent bronchovascular markings. Right basilar infiltrates   cannot be excluded

## 2017-12-10 NOTE — PROGRESS NOTE ADULT - PROBLEM SELECTOR PLAN 1
Patient Hgb dropped from 10.8 to 9.6. Decrease in PLT count to so could be dilutional but difficult to determine with increasing white count. No episodes of hematemesis or bloody BMs as reported by nursing.  -Continue to trend daily CBC: can increase frequency if patient becomes tachy or suspect active bleed  -maintain active type and screen: patient will need 1 additional type and screen ordered  -will transfuse if Hgb <7; currently 9.6  -Suspected source of loss Upper GI bleed associated with hematemesis. Given possible risk will start BID PPI, can de-escalate if patient Hgb stabilizes as no other indication for PPI at this point.

## 2017-12-10 NOTE — PROGRESS NOTE ADULT - ATTENDING COMMENTS
Patient seen and examined, I agree with the above assessment and plan.     83M non-verbal with obstructive hydrocephalus, dementia, dysphagia, CKD, urinary retention w/ suprapubic catheter, DM, Hypothyroid, HTN, HLD, seizure disorder presenting with episode of coffee ground emesis and admitted for possible UGIB, treatment of acute cystitis, and potential of aspiration PNA    #fever - HCAP +/- UTI. patient likely has chornic bactruia from suprapubic cath, unable to relay symptoms. had had cough with suspicon of aspiraiton in setting of recent vomiting. broadly treating  -tx with vanc zosyn pending clinical course  -repeat CXR  -f/u blood and urine culture    #anemia - unknown etiology, no signs of acute bleeding. if continues to drop will send hemolytic workup +/- abdominal imaging as no evident GI bleeding on exam and BUN not consistent with GIB    #obstructive hydrocephalus  #HTN  #HLD

## 2017-12-10 NOTE — PROGRESS NOTE ADULT - PROBLEM SELECTOR PLAN 3
Concern for possible aspiration pneumonia given pt w/ episode of coffee ground emesis, diffuse rhonchi and gargling noises on exam, and hx of dysphagia.   -blood cultures: NGTD  -RVP: negative  -CXR possible for source of aspiration pneumonia, will keep in DDx for possible source of infection and repeat CXR if patient spikes fever or continues to having rising white count  -Zosyn 2.25g q6h to cover for GI nikki causing aspiration PNA

## 2017-12-11 DIAGNOSIS — J69.0 PNEUMONITIS DUE TO INHALATION OF FOOD AND VOMIT: ICD-10-CM

## 2017-12-11 DIAGNOSIS — G40.909 EPILEPSY, UNSPECIFIED, NOT INTRACTABLE, WITHOUT STATUS EPILEPTICUS: ICD-10-CM

## 2017-12-11 LAB
-  AMPICILLIN: SIGNIFICANT CHANGE UP
-  CIPROFLOXACIN: SIGNIFICANT CHANGE UP
-  COAGULASE NEGATIVE STAPHYLOCOCCUS: SIGNIFICANT CHANGE UP
-  NITROFURANTOIN: SIGNIFICANT CHANGE UP
-  TETRACYCLINE: SIGNIFICANT CHANGE UP
-  VANCOMYCIN: SIGNIFICANT CHANGE UP
ANION GAP SERPL CALC-SCNC: 16 MMOL/L — SIGNIFICANT CHANGE UP (ref 5–17)
BUN SERPL-MCNC: 12 MG/DL — SIGNIFICANT CHANGE UP (ref 7–23)
CALCIUM SERPL-MCNC: 7.8 MG/DL — LOW (ref 8.4–10.5)
CHLORIDE SERPL-SCNC: 109 MMOL/L — HIGH (ref 96–108)
CO2 SERPL-SCNC: 18 MMOL/L — LOW (ref 22–31)
CREAT SERPL-MCNC: 1.49 MG/DL — HIGH (ref 0.5–1.3)
CULTURE RESULTS: SIGNIFICANT CHANGE UP
FOLATE SERPL-MCNC: 18.2 NG/ML — SIGNIFICANT CHANGE UP (ref 4.8–24.2)
GLUCOSE BLDC GLUCOMTR-MCNC: 127 MG/DL — HIGH (ref 70–99)
GLUCOSE BLDC GLUCOMTR-MCNC: 127 MG/DL — HIGH (ref 70–99)
GLUCOSE BLDC GLUCOMTR-MCNC: 157 MG/DL — HIGH (ref 70–99)
GLUCOSE BLDC GLUCOMTR-MCNC: 202 MG/DL — HIGH (ref 70–99)
GLUCOSE SERPL-MCNC: 151 MG/DL — HIGH (ref 70–99)
GRAM STN FLD: SIGNIFICANT CHANGE UP
GRAM STN FLD: SIGNIFICANT CHANGE UP
HAV IGM SER-ACNC: SIGNIFICANT CHANGE UP
HBV CORE IGM SER-ACNC: SIGNIFICANT CHANGE UP
HBV SURFACE AG SER-ACNC: SIGNIFICANT CHANGE UP
HCT VFR BLD CALC: 30.4 % — LOW (ref 39–50)
HCV AB S/CO SERPL IA: 0.23 S/CO — SIGNIFICANT CHANGE UP
HCV AB SERPL-IMP: SIGNIFICANT CHANGE UP
HGB BLD-MCNC: 9.8 G/DL — LOW (ref 13–17)
MAGNESIUM SERPL-MCNC: 1.7 MG/DL — SIGNIFICANT CHANGE UP (ref 1.6–2.6)
MCHC RBC-ENTMCNC: 28.1 PG — SIGNIFICANT CHANGE UP (ref 27–34)
MCHC RBC-ENTMCNC: 32.2 G/DL — SIGNIFICANT CHANGE UP (ref 32–36)
MCV RBC AUTO: 87.1 FL — SIGNIFICANT CHANGE UP (ref 80–100)
METHOD TYPE: SIGNIFICANT CHANGE UP
METHOD TYPE: SIGNIFICANT CHANGE UP
ORGANISM # SPEC MICROSCOPIC CNT: SIGNIFICANT CHANGE UP
ORGANISM # SPEC MICROSCOPIC CNT: SIGNIFICANT CHANGE UP
PLATELET # BLD AUTO: 247 K/UL — SIGNIFICANT CHANGE UP (ref 150–400)
POTASSIUM SERPL-MCNC: 3.4 MMOL/L — LOW (ref 3.5–5.3)
POTASSIUM SERPL-SCNC: 3.4 MMOL/L — LOW (ref 3.5–5.3)
RBC # BLD: 3.49 M/UL — LOW (ref 4.2–5.8)
RBC # FLD: 13.8 % — SIGNIFICANT CHANGE UP (ref 10.3–16.9)
SODIUM SERPL-SCNC: 143 MMOL/L — SIGNIFICANT CHANGE UP (ref 135–145)
SPECIMEN SOURCE: SIGNIFICANT CHANGE UP
VIT B12 SERPL-MCNC: 612 PG/ML — SIGNIFICANT CHANGE UP (ref 243–894)
WBC # BLD: 10.3 K/UL — SIGNIFICANT CHANGE UP (ref 3.8–10.5)
WBC # FLD AUTO: 10.3 K/UL — SIGNIFICANT CHANGE UP (ref 3.8–10.5)

## 2017-12-11 PROCEDURE — 99233 SBSQ HOSP IP/OBS HIGH 50: CPT | Mod: GC

## 2017-12-11 RX ORDER — METOPROLOL TARTRATE 50 MG
5 TABLET ORAL EVERY 6 HOURS
Qty: 0 | Refills: 0 | Status: DISCONTINUED | OUTPATIENT
Start: 2017-12-11 | End: 2017-12-13

## 2017-12-11 RX ORDER — AMPICILLIN SODIUM AND SULBACTAM SODIUM 250; 125 MG/ML; MG/ML
1.5 INJECTION, POWDER, FOR SUSPENSION INTRAMUSCULAR; INTRAVENOUS EVERY 6 HOURS
Qty: 0 | Refills: 0 | Status: DISCONTINUED | OUTPATIENT
Start: 2017-12-11 | End: 2017-12-12

## 2017-12-11 RX ORDER — PIPERACILLIN AND TAZOBACTAM 4; .5 G/20ML; G/20ML
2.25 INJECTION, POWDER, LYOPHILIZED, FOR SOLUTION INTRAVENOUS EVERY 6 HOURS
Qty: 0 | Refills: 0 | Status: DISCONTINUED | OUTPATIENT
Start: 2017-12-11 | End: 2017-12-11

## 2017-12-11 RX ORDER — LEVETIRACETAM 250 MG/1
500 TABLET, FILM COATED ORAL EVERY 12 HOURS
Qty: 0 | Refills: 0 | Status: DISCONTINUED | OUTPATIENT
Start: 2017-12-11 | End: 2017-12-13

## 2017-12-11 RX ORDER — VANCOMYCIN HCL 1 G
1000 VIAL (EA) INTRAVENOUS EVERY 12 HOURS
Qty: 0 | Refills: 0 | Status: DISCONTINUED | OUTPATIENT
Start: 2017-12-11 | End: 2017-12-11

## 2017-12-11 RX ORDER — LEVOTHYROXINE SODIUM 125 MCG
25 TABLET ORAL DAILY
Qty: 0 | Refills: 0 | Status: DISCONTINUED | OUTPATIENT
Start: 2017-12-12 | End: 2017-12-13

## 2017-12-11 RX ADMIN — LEVETIRACETAM 420 MILLIGRAM(S): 250 TABLET, FILM COATED ORAL at 18:36

## 2017-12-11 RX ADMIN — PANTOPRAZOLE SODIUM 40 MILLIGRAM(S): 20 TABLET, DELAYED RELEASE ORAL at 06:13

## 2017-12-11 RX ADMIN — AMPICILLIN SODIUM AND SULBACTAM SODIUM 100 GRAM(S): 250; 125 INJECTION, POWDER, FOR SUSPENSION INTRAMUSCULAR; INTRAVENOUS at 18:35

## 2017-12-11 RX ADMIN — SODIUM CHLORIDE 75 MILLILITER(S): 9 INJECTION INTRAMUSCULAR; INTRAVENOUS; SUBCUTANEOUS at 18:39

## 2017-12-11 RX ADMIN — SODIUM CHLORIDE 75 MILLILITER(S): 9 INJECTION INTRAMUSCULAR; INTRAVENOUS; SUBCUTANEOUS at 06:21

## 2017-12-11 RX ADMIN — Medication 5 MILLIGRAM(S): at 19:19

## 2017-12-11 RX ADMIN — Medication 5 MILLIGRAM(S): at 14:11

## 2017-12-11 RX ADMIN — Medication 1.25 MILLIGRAM(S): at 18:21

## 2017-12-11 RX ADMIN — HEPARIN SODIUM 5000 UNIT(S): 5000 INJECTION INTRAVENOUS; SUBCUTANEOUS at 22:44

## 2017-12-11 RX ADMIN — PIPERACILLIN AND TAZOBACTAM 200 GRAM(S): 4; .5 INJECTION, POWDER, LYOPHILIZED, FOR SOLUTION INTRAVENOUS at 06:12

## 2017-12-11 RX ADMIN — Medication 1.25 MILLIGRAM(S): at 11:51

## 2017-12-11 RX ADMIN — Medication 250 MILLIGRAM(S): at 14:03

## 2017-12-11 RX ADMIN — Medication 2: at 18:36

## 2017-12-11 RX ADMIN — HEPARIN SODIUM 5000 UNIT(S): 5000 INJECTION INTRAVENOUS; SUBCUTANEOUS at 06:13

## 2017-12-11 RX ADMIN — PIPERACILLIN AND TAZOBACTAM 200 GRAM(S): 4; .5 INJECTION, POWDER, LYOPHILIZED, FOR SOLUTION INTRAVENOUS at 11:41

## 2017-12-11 RX ADMIN — PANTOPRAZOLE SODIUM 40 MILLIGRAM(S): 20 TABLET, DELAYED RELEASE ORAL at 18:36

## 2017-12-11 RX ADMIN — HEPARIN SODIUM 5000 UNIT(S): 5000 INJECTION INTRAVENOUS; SUBCUTANEOUS at 14:10

## 2017-12-11 NOTE — PROGRESS NOTE ADULT - PROBLEM SELECTOR PLAN 4
Switch home medication to IV while patient is NPO pending speech and swallow screen.   -Continue 500mg Keppra IV q12h

## 2017-12-11 NOTE — PROGRESS NOTE ADULT - PROBLEM SELECTOR PLAN 3
Patient with RLL infiltrate with initial fever and increasing white count. Likelihood that patient has aspiration pneumonia over HAP.  -Continue Vancomycin 1g q24h, Vancomycin trough before 12/13 dose  -Continue Zosyn 2.25g q6h  -Pending final speciation and sensitivity of Gram Positive Cocci in clusters

## 2017-12-11 NOTE — SWALLOW BEDSIDE ASSESSMENT ADULT - ASR SWALLOW ASPIRATION MONITOR
change of breathing pattern/gurgly voice/upper respiratory infection/oral hygiene/fever/pneumonia/throat clearing/position upright (90Y)/cough

## 2017-12-11 NOTE — PROGRESS NOTE ADULT - PROBLEM SELECTOR PLAN 10
F: NS 75cc/h: will add dextrose if patient fails bedside dysphagia screen  E: Replete PRN   N: NPO due to history of dysphagia and possible GI bleed  P: 5000U HSQ q8h  C: FULL CODE  D: Continue on RMF, dispo pending stabilization of hemoglobin and determination of source of possible infection

## 2017-12-11 NOTE — DIETITIAN INITIAL EVALUATION ADULT. - OTHER INFO
82 yo/male admitted from NH with UTI, and coffee ground emesis; concern for possible GIB and aspiration PNA 2/2 vomiting. Pt with PMHx non-verbal dementia, obstructive hydrocephalus, CKD, HTN, DM, HLD. Pt visited in room, non-verbal, unable to obtain any past nutrition history from patient, no family available at this time. Pt failed bedside dysphagia swallow and failed swallow evaluation by SLP; recommendation to keep pt NPO and consult for palliative for goals of care. Unsure of HCP status at this time. Will continue to monitor for goals of care.

## 2017-12-11 NOTE — PROGRESS NOTE ADULT - PROBLEM SELECTOR PLAN 2
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

## 2017-12-11 NOTE — DIETITIAN INITIAL EVALUATION ADULT. - PROBLEM SELECTOR PLAN 1
Pt reported to have one episode of medium sized coffee ground vomitus prior to coming to [facility] from NH. As per NH records pt w/ a Hgb of 12.7 on 12/8 now presenting w/ a Hgb of 10.8. Rectal exam performed but no stool noted in the rectal vault. Given pt is hemodynamically stable low concern for active bleed at this moment, will need to rule out infectious etiology first.   -repeat CBC @10pm   -will maintain active type and screen   -will transfuse if Hgb <7

## 2017-12-11 NOTE — PROGRESS NOTE ADULT - PROBLEM SELECTOR PLAN 7
Patient has hypertension with increasing blood pressures.   -Begin IV Enalapril 1.25mg q6h and can titrate as needed  -Switch 100mg Metoprolol BID to 5mg Metoprolol IVP q6h can titrate as needed  -Can return to home regimen following speech and swallow

## 2017-12-11 NOTE — DIETITIAN INITIAL EVALUATION ADULT. - ENERGY NEEDS
No height listed in chart at this time and unable to estimate height 2/2 contracted body  .2#; pt appears well nourished, no physical s/s malnutrition  Needs estimated 2/2 age.

## 2017-12-11 NOTE — DIETITIAN INITIAL EVALUATION ADULT. - PROBLEM SELECTOR PLAN 2
Pt febrile in the ED to 104. Did not meet SIRS criteria. UA was positive but given pt w/ chronic suprapubic catheter and hx of positive UA on previous admission will not rule out other etiologies. Concern for possible aspiration pneumonia given pt w/ episode of coffee ground emesis, diffuse rhonchi and gargling noises on exam, and hx of dysphagia.   -f/u blood cultures  -f/u urine cultures  -f/u RVP  -will continue w/ vanc + zosyn for now  -f/u repeat chest xray in AM

## 2017-12-11 NOTE — PROGRESS NOTE ADULT - PROBLEM SELECTOR PLAN 6
Patient NPO currently and cannot take home medications.  -Hold Synthroid pending clearance of speech and swallow

## 2017-12-11 NOTE — SWALLOW BEDSIDE ASSESSMENT ADULT - NS SPL SWALLOW CLINIC TRIAL FT
Pt presents with suspected pharyngeal stage dysphagia characterized by overt signs of aspiration with thin liquids. SLP unable to test other consistencies due to cognitive deficits - Pt bit on a blanket and refused to have it removed from oral cavity. Given advanced dementia and current presentation, palliative care consult is strongly recommended to further establish long term feeding plan. Research does not support PEG tube placement in individuals with advanced dementia.

## 2017-12-11 NOTE — PROGRESS NOTE ADULT - PROBLEM SELECTOR PLAN 5
Patient NPO currently and cannot take home medications.  -Hold Atorvastatin pending clearance of speech and swallow

## 2017-12-11 NOTE — PROGRESS NOTE ADULT - SUBJECTIVE AND OBJECTIVE BOX
83M non-verbal with obstructive hydrocephalus, dementia, dysphagia, CKD, urinary retention w/ suprapubic catheter, DM, Hypothyroid, HTN, HLD, seizure disorder presenting with episode of coffee ground emesis and admitted for UGIB and potential of aspiration PNA  O/N Events: No acute events overnight.   Subjective/ROS: Patient is nonverbal and unable to express any complaints at this time.    VITALS  Vital Signs Last 24 Hrs  T(C): 35.6 (11 Dec 2017 08:51), Max: 37.4 (11 Dec 2017 05:03)  T(F): 96 (11 Dec 2017 08:51), Max: 99.3 (11 Dec 2017 05:03)  HR: 68 (11 Dec 2017 08:51) (63 - 73)  BP: 170/88 (11 Dec 2017 08:51) (154/79 - 170/88)  BP(mean): --  RR: 18 (11 Dec 2017 08:51) (18 - 19)  SpO2: 97% (11 Dec 2017 08:51) (95% - 98%)    CAPILLARY BLOOD GLUCOSE  POCT Blood Glucose.: 127 mg/dL (11 Dec 2017 11:39)  POCT Blood Glucose.: 157 mg/dL (11 Dec 2017 07:39)  POCT Blood Glucose.: 156 mg/dL (10 Dec 2017 21:23)  POCT Blood Glucose.: 174 mg/dL (10 Dec 2017 17:58)    PHYSICAL EXAM  General: A&Ox0; NAD  Head: NC/AT; MMM; PERRL; EOMI;  Neck: Supple; no JVD  Respiratory: bibasilar crackles heard with bronchial rhonchi   Cardiovascular: Regular rhythm/rate; S1/S2   Gastrointestinal: Soft; NTND; normoactive BS  Extremities: WWP; no edema/cyanosis  Neurological:  CNII-XII grossly intact; no obvious focal deficits    MEDICATIONS  (STANDING):  amLODIPine   Tablet 5 milliGRAM(s) Oral daily  aspirin enteric coated 81 milliGRAM(s) Oral daily  atorvastatin 10 milliGRAM(s) Oral at bedtime  dextrose 5%. 1000 milliLiter(s) (50 mL/Hr) IV Continuous <Continuous>  dextrose 50% Injectable 12.5 Gram(s) IV Push once  dextrose 50% Injectable 25 Gram(s) IV Push once  dextrose 50% Injectable 25 Gram(s) IV Push once  enalaprilat Injectable 1.25 milliGRAM(s) IV Push every 6 hours  heparin  Injectable 5000 Unit(s) SubCutaneous every 8 hours  insulin lispro (HumaLOG) corrective regimen sliding scale   SubCutaneous Before meals and at bedtime  levETIRAcetam  IVPB 500 milliGRAM(s) IV Intermittent every 12 hours  levothyroxine 50 MICROGram(s) Oral daily  metoprolol    tartrate Injectable 5 milliGRAM(s) IV Push every 6 hours  pantoprazole  Injectable 40 milliGRAM(s) IV Push two times a day  piperacillin/tazobactam IVPB. 2.25 Gram(s) IV Intermittent every 6 hours  sodium chloride 0.9%. 1000 milliLiter(s) (75 mL/Hr) IV Continuous <Continuous>  vancomycin  IVPB 1000 milliGRAM(s) IV Intermittent every 12 hours    MEDICATIONS  (PRN):  acetaminophen   Tablet 650 milliGRAM(s) Oral every 6 hours PRN For Temp greater than 38 C (100.4 F)  dextrose Gel 1 Dose(s) Oral once PRN Blood Glucose LESS THAN 70 milliGRAM(s)/deciliter  glucagon  Injectable 1 milliGRAM(s) IntraMuscular once PRN Glucose LESS THAN 70 milligrams/deciliter      No Known Allergies      LABS                        9.8    10.3  )-----------( 247      ( 11 Dec 2017 08:53 )             30.4     12-11    143  |  109<H>  |  12  ----------------------------<  151<H>  3.4<L>   |  18<L>  |  1.49<H>    Ca    7.8<L>      11 Dec 2017 08:53  Phos  1.9     12-10  Mg     1.7         TPro  6.6  /  Alb  2.8<L>  /  TBili  0.7  /  DBili  x   /  AST  51<H>  /  ALT  36  /  AlkPhos  96  12-10    PT/INR - ( 09 Dec 2017 12:47 )   PT: 14.0 sec;   INR: 1.26          PTT - ( 09 Dec 2017 12:47 )  PTT:28.5 sec  Urinalysis Basic - ( 09 Dec 2017 13:12 )    Color: Yellow / Appearance: Clear / S.010 / pH: x  Gluc: x / Ketone: NEGATIVE  / Bili: Negative / Urobili: 0.2 E.U./dL   Blood: x / Protein: 30 mg/dL / Nitrite: NEGATIVE   Leuk Esterase: Small / RBC: 5-10 /HPF / WBC > 10 /HPF   Sq Epi: x / Non Sq Epi: x / Bacteria: Present /HPF      Culture - Blood (17 @ 14:40)    Gram Stain: Aerobic Bottle: Gram Positive Cocci in Clusters

## 2017-12-11 NOTE — PROGRESS NOTE ADULT - ATTENDING COMMENTS
Function Quadriplegia: Frequent turning.     Dysphagia: Pending S&S    Aspiration PNA: Change to unasyn 3G    Positive gram positive cocci blood culture: Coag neg staph. Likely contaminate. Repeat cx today.

## 2017-12-11 NOTE — PROGRESS NOTE ADULT - PROBLEM SELECTOR PLAN 1
Patient Hgb dropped from 10.8 to 9.6, now 9.8. No episodes of hematemesis or bloody BMs as reported by nursing.  -Continue to trend daily CBC: can increase frequency if patient becomes tachy or suspect active bleed  -maintain active type and screen  -will transfuse if Hgb <7; currently 9.8  -Suspected source of loss Upper GI bleed associated with hematemesis. Unclear possible etiology (esophageal vs. peptic vs. duodenal). Patient hemoglobin currently stable and can follow up as outpatient with GI for endoscopy.

## 2017-12-11 NOTE — DIETITIAN INITIAL EVALUATION ADULT. - PROBLEM SELECTOR PLAN 3
Pt w/ suprapubic indwelling urinary catheter found to have a positive UA. Pt febrile in the ED to 104 likely secondary to UTI. S/p 1 dose of Vanc + Zosyn in the ED.   -c/w suprapubic catheter

## 2017-12-12 ENCOUNTER — TRANSCRIPTION ENCOUNTER (OUTPATIENT)
Age: 82
End: 2017-12-12

## 2017-12-12 DIAGNOSIS — Z51.5 ENCOUNTER FOR PALLIATIVE CARE: ICD-10-CM

## 2017-12-12 DIAGNOSIS — Z71.89 OTHER SPECIFIED COUNSELING: ICD-10-CM

## 2017-12-12 DIAGNOSIS — R41.89 OTHER SYMPTOMS AND SIGNS INVOLVING COGNITIVE FUNCTIONS AND AWARENESS: ICD-10-CM

## 2017-12-12 DIAGNOSIS — R13.10 DYSPHAGIA, UNSPECIFIED: ICD-10-CM

## 2017-12-12 DIAGNOSIS — D62 ACUTE POSTHEMORRHAGIC ANEMIA: ICD-10-CM

## 2017-12-12 DIAGNOSIS — F02.80 DEMENTIA IN OTHER DISEASES CLASSIFIED ELSEWHERE, UNSPECIFIED SEVERITY, WITHOUT BEHAVIORAL DISTURBANCE, PSYCHOTIC DISTURBANCE, MOOD DISTURBANCE, AND ANXIETY: ICD-10-CM

## 2017-12-12 DIAGNOSIS — Z71.0 PERSON ENCOUNTERING HEALTH SERVICES TO CONSULT ON BEHALF OF ANOTHER PERSON: ICD-10-CM

## 2017-12-12 DIAGNOSIS — J69.0 PNEUMONITIS DUE TO INHALATION OF FOOD AND VOMIT: ICD-10-CM

## 2017-12-12 LAB
ANION GAP SERPL CALC-SCNC: 21 MMOL/L — HIGH (ref 5–17)
BUN SERPL-MCNC: 10 MG/DL — SIGNIFICANT CHANGE UP (ref 7–23)
CALCIUM SERPL-MCNC: 8.7 MG/DL — SIGNIFICANT CHANGE UP (ref 8.4–10.5)
CHLORIDE SERPL-SCNC: 105 MMOL/L — SIGNIFICANT CHANGE UP (ref 96–108)
CO2 SERPL-SCNC: 16 MMOL/L — LOW (ref 22–31)
CREAT SERPL-MCNC: 1.21 MG/DL — SIGNIFICANT CHANGE UP (ref 0.5–1.3)
GLUCOSE BLDC GLUCOMTR-MCNC: 138 MG/DL — HIGH (ref 70–99)
GLUCOSE BLDC GLUCOMTR-MCNC: 146 MG/DL — HIGH (ref 70–99)
GLUCOSE BLDC GLUCOMTR-MCNC: 153 MG/DL — HIGH (ref 70–99)
GLUCOSE BLDC GLUCOMTR-MCNC: 161 MG/DL — HIGH (ref 70–99)
GLUCOSE SERPL-MCNC: 156 MG/DL — HIGH (ref 70–99)
HCT VFR BLD CALC: 30.9 % — LOW (ref 39–50)
HGB BLD-MCNC: 10.4 G/DL — LOW (ref 13–17)
MAGNESIUM SERPL-MCNC: 1.7 MG/DL — SIGNIFICANT CHANGE UP (ref 1.6–2.6)
MCHC RBC-ENTMCNC: 28.6 PG — SIGNIFICANT CHANGE UP (ref 27–34)
MCHC RBC-ENTMCNC: 33.7 G/DL — SIGNIFICANT CHANGE UP (ref 32–36)
MCV RBC AUTO: 84.9 FL — SIGNIFICANT CHANGE UP (ref 80–100)
PLATELET # BLD AUTO: 343 K/UL — SIGNIFICANT CHANGE UP (ref 150–400)
POTASSIUM SERPL-MCNC: 3.2 MMOL/L — LOW (ref 3.5–5.3)
POTASSIUM SERPL-SCNC: 3.2 MMOL/L — LOW (ref 3.5–5.3)
RBC # BLD: 3.64 M/UL — LOW (ref 4.2–5.8)
RBC # FLD: 14.1 % — SIGNIFICANT CHANGE UP (ref 10.3–16.9)
SODIUM SERPL-SCNC: 142 MMOL/L — SIGNIFICANT CHANGE UP (ref 135–145)
WBC # BLD: 8.6 K/UL — SIGNIFICANT CHANGE UP (ref 3.8–10.5)
WBC # FLD AUTO: 8.6 K/UL — SIGNIFICANT CHANGE UP (ref 3.8–10.5)

## 2017-12-12 PROCEDURE — 99223 1ST HOSP IP/OBS HIGH 75: CPT

## 2017-12-12 PROCEDURE — 99233 SBSQ HOSP IP/OBS HIGH 50: CPT | Mod: GC

## 2017-12-12 PROCEDURE — 99497 ADVNCD CARE PLAN 30 MIN: CPT | Mod: 25

## 2017-12-12 RX ORDER — POTASSIUM CHLORIDE 20 MEQ
10 PACKET (EA) ORAL
Qty: 0 | Refills: 0 | Status: COMPLETED | OUTPATIENT
Start: 2017-12-12 | End: 2017-12-12

## 2017-12-12 RX ORDER — AMPICILLIN SODIUM AND SULBACTAM SODIUM 250; 125 MG/ML; MG/ML
3 INJECTION, POWDER, FOR SUSPENSION INTRAMUSCULAR; INTRAVENOUS EVERY 8 HOURS
Qty: 0 | Refills: 0 | Status: DISCONTINUED | OUTPATIENT
Start: 2017-12-12 | End: 2017-12-13

## 2017-12-12 RX ADMIN — AMPICILLIN SODIUM AND SULBACTAM SODIUM 200 GRAM(S): 250; 125 INJECTION, POWDER, FOR SUSPENSION INTRAMUSCULAR; INTRAVENOUS at 19:05

## 2017-12-12 RX ADMIN — HEPARIN SODIUM 5000 UNIT(S): 5000 INJECTION INTRAVENOUS; SUBCUTANEOUS at 14:15

## 2017-12-12 RX ADMIN — AMPICILLIN SODIUM AND SULBACTAM SODIUM 100 GRAM(S): 250; 125 INJECTION, POWDER, FOR SUSPENSION INTRAMUSCULAR; INTRAVENOUS at 06:41

## 2017-12-12 RX ADMIN — Medication 5 MILLIGRAM(S): at 00:45

## 2017-12-12 RX ADMIN — PANTOPRAZOLE SODIUM 40 MILLIGRAM(S): 20 TABLET, DELAYED RELEASE ORAL at 06:39

## 2017-12-12 RX ADMIN — PANTOPRAZOLE SODIUM 40 MILLIGRAM(S): 20 TABLET, DELAYED RELEASE ORAL at 17:41

## 2017-12-12 RX ADMIN — HEPARIN SODIUM 5000 UNIT(S): 5000 INJECTION INTRAVENOUS; SUBCUTANEOUS at 22:40

## 2017-12-12 RX ADMIN — Medication 100 MILLIEQUIVALENT(S): at 15:07

## 2017-12-12 RX ADMIN — Medication 25 MICROGRAM(S): at 05:46

## 2017-12-12 RX ADMIN — Medication 1: at 22:39

## 2017-12-12 RX ADMIN — Medication 5 MILLIGRAM(S): at 06:21

## 2017-12-12 RX ADMIN — Medication 2.5 MILLIGRAM(S): at 17:42

## 2017-12-12 RX ADMIN — Medication 100 MILLIEQUIVALENT(S): at 19:05

## 2017-12-12 RX ADMIN — Medication 5 MILLIGRAM(S): at 17:42

## 2017-12-12 RX ADMIN — Medication 100 MILLIEQUIVALENT(S): at 16:39

## 2017-12-12 RX ADMIN — Medication 1.25 MILLIGRAM(S): at 06:21

## 2017-12-12 RX ADMIN — LEVETIRACETAM 420 MILLIGRAM(S): 250 TABLET, FILM COATED ORAL at 18:36

## 2017-12-12 RX ADMIN — Medication 1: at 18:00

## 2017-12-12 RX ADMIN — LEVETIRACETAM 420 MILLIGRAM(S): 250 TABLET, FILM COATED ORAL at 06:42

## 2017-12-12 RX ADMIN — Medication 1.25 MILLIGRAM(S): at 00:45

## 2017-12-12 RX ADMIN — HEPARIN SODIUM 5000 UNIT(S): 5000 INJECTION INTRAVENOUS; SUBCUTANEOUS at 06:39

## 2017-12-12 RX ADMIN — AMPICILLIN SODIUM AND SULBACTAM SODIUM 100 GRAM(S): 250; 125 INJECTION, POWDER, FOR SUSPENSION INTRAMUSCULAR; INTRAVENOUS at 01:21

## 2017-12-12 RX ADMIN — Medication 5 MILLIGRAM(S): at 11:29

## 2017-12-12 RX ADMIN — Medication 2.5 MILLIGRAM(S): at 11:28

## 2017-12-12 RX ADMIN — AMPICILLIN SODIUM AND SULBACTAM SODIUM 200 GRAM(S): 250; 125 INJECTION, POWDER, FOR SUSPENSION INTRAMUSCULAR; INTRAVENOUS at 14:11

## 2017-12-12 NOTE — DISCHARGE NOTE ADULT - PLAN OF CARE
To treat You were admitted to the hospital secondary to having an episode of bloody vomitus at your nursing home. During this episode you may have aspirated which resulted in an infection in your lung. You were initially treated with Vancomycin and Zosyn and then switched to Unasyn for your infection. You will need to continue antibiotics for 3 more days to complete your 7 day course. During your stay you were found to have difficulty swallowing. Our speech and swallow team were unable to fully assess your swallow. During talks with our palliative team your HCP agreed it would be best to allow you to continue to eat anyway with comfort feedings. During your admission you had an acute drop in hemoglobin which stabilized. You most likely had this secondary to an Upper GI bleed. It is recommended you see a gastroenterologist as an outpatient to get an esophageal gastroduodenal endoscopy. You were admitted to the hospital secondary to having an episode of bloody vomitus at your nursing home. During this episode you may have aspirated which resulted in an infection in your lung. You were initially treated with Vancomycin and Zosyn and then switched to Unasyn for your infection. During your stay you were found to have difficulty swallowing. Our speech and swallow team were unable to fully assess your swallow. During talks with our palliative team your HCP agreed it would be best to allow you to continue to eat anyway with comfort feedings even though there is a risk of aspiration. During your admission you had an acute drop in hemoglobin which stabilized. You most likely had this secondary to an Upper GI bleed. It is recommended you see a gastroenterologist as an outpatient to get an esophageal gastroduodenal endoscopy Medication compliance Please continue your home medications You were admitted to the hospital secondary to having an episode of bloody vomitus at your nursing home. During this episode you may have aspirated which resulted in an infection in your lung. You were initially treated with Vancomycin and Zosyn and then switched to Unasyn for your infection for total 5 days of antibiotics. Please continue your home medications Metoprolol, Hydralazine and Amlodipine.   We started you on 0.1 Clonidine patch for high blood pressure given your difficulty with swallowing. Please make further changes as needed with your PCP Your potassium was consistently low during this admission. We have increased your daily potassium powder dose to 40meq daily.

## 2017-12-12 NOTE — CONSULT NOTE ADULT - ASSESSMENT
83 year old man with history of cognitive declined currently living at NH presents with fever and concerns for dysphagia and aspiration pneumonia. Found to have fever and emesis. Treatment for UTI and HCAP. Consult for GOC.

## 2017-12-12 NOTE — CONSULT NOTE ADULT - PROBLEM SELECTOR RECOMMENDATION 2
Concerns of progressive dysphagia 2/2 underlaying dementia was discussed with the patients daughter. Discussed comfort feedings and current precautions given full code status. Daughter understands that artificial feeding tube might not be appropriate and would like to focus on comfort feedings and quality of life.  She will be at bedside later tonight and will discuss further with team.

## 2017-12-12 NOTE — DISCHARGE NOTE ADULT - HOSPITAL COURSE
83M non-verbal with obstructive hydrocephalus, dementia, dysphagia, CKD, urinary retention w/ suprapubic catheter, DM, Hypothyroid, HTN, HLD, seizure disorder presenting with episode of coffee ground emesis, fever to 104 and concern for aspiration pneumonia. The patient was initially started on Vancomycin and Zosyn for broad coverage while source of infection was determined. His hemoglobin on admission was 10 and then dropped to 9.6 but stabilized and he did not require any transfusions. His white count peaked and then began to down trend. UCx grew Enterococcus but it was suspected that he was chronically colonized and this was not the source of infection. CXR showed a RLL infiltrate which was concerning for pneumonia. Given his history of dysphagia and episode of vomiting treatment for aspiration pneumonia began and abx were transitioned to Unasyn. Patient completed 4 days of antibiotics as an inpatient with plans to continue for 3 more days. Speech and swallow evaluated the patient and the patient was unable to even swallow water. After Palliative discussion with family they agreed to comfort feeds for the patient and his diet of dysphagia honey thick liquid was started. On day of discharge the patient was afebrile, vital signs were stable. Plans were made for the patient to return to his facility to finish treatment and follow up with Dr. Vazquez as an outpatient. 83M non-verbal with obstructive hydrocephalus, dementia, dysphagia, CKD, urinary retention w/ suprapubic catheter, DM, Hypothyroid, HTN, HLD, seizure disorder presenting with episode of coffee ground emesis, fever to 104 and concern for aspiration pneumonia. The patient was initially started on Vancomycin and Zosyn for broad coverage while source of infection was determined. His hemoglobin on admission was 10 and then dropped to 9.6 but stabilized and he did not require any transfusions. His white count peaked and then began to down trend. UCx grew Enterococcus but it was suspected that he was chronically colonized and this was not the source of infection. CXR showed a RLL infiltrate which was concerning for pneumonia. Given his history of dysphagia and episode of vomiting treatment for aspiration pneumonia began and abx were transitioned to Unasyn. Patient completed 5 days course of abx inpatient.. Speech and swallow evaluated the patient and the patient was unable to even swallow water. After Palliative discussion with family they agreed to comfort feeds for the patient and his diet of dysphagia honey thick liquid was started. On day of discharge the patient was afebrile, vital signs were stable. Plans were made for the patient to return to his facility to finish treatment and follow up with Dr. Vazquez as an outpatient. 83M non-verbal with obstructive hydrocephalus, dementia, dysphagia, CKD, urinary retention w/ suprapubic catheter, DM, Hypothyroid, HTN, HLD, seizure disorder presenting with episode of coffee ground emesis, fever to 104 and concern for aspiration pneumonia. The patient was initially started on Vancomycin and Zosyn for broad coverage while source of infection was determined. His hemoglobin on admission was 10 and then dropped to 9.6 but stabilized and he did not require any transfusions. His white count peaked and then began to down trend. UCx grew Enterococcus but it was suspected that he was chronically colonized and this was not the source of infection. CXR showed a RLL infiltrate which was concerning for pneumonia. Given his history of dysphagia and episode of vomiting treatment for aspiration pneumonia began and abx were transitioned to Unasyn. Patient completed 5 days course of abx inpatient. Speech and swallow evaluated the patient and the patient was unable to even swallow water. After Palliative discussion with family they agreed to comfort feeds for the patient and his diet of dysphagia honey thick liquid was started. On day of discharge the patient was afebrile, vital signs were stable. Plans were made for the patient to return to his facility to finish treatment and follow up with Dr. Vazquez as an outpatient.

## 2017-12-12 NOTE — PROGRESS NOTE ADULT - PROBLEM SELECTOR PLAN 1
Patient with RLL infiltrate with initial fever and increasing white count. Likelihood that patient has aspiration pneumonia over HAP.  -Discontinued Vancomycin yesterday following Coag Negative Staph results  -Discontinued Zosyn 2.25g q6h  -Started Unasyn 1.5g q6h on day 3 of total abx coverage  -BCx Coag Negative Staph. Most likely contaminate, surveillance culture sent this morning

## 2017-12-12 NOTE — DISCHARGE NOTE ADULT - MEDICATION SUMMARY - MEDICATIONS TO CHANGE
I will SWITCH the dose or number of times a day I take the medications listed below when I get home from the hospital:    levothyroxine 25 mcg (0.025 mg) oral capsule  -- 1 cap(s) by mouth once a day

## 2017-12-12 NOTE — CONSULT NOTE ADULT - PROBLEM SELECTOR RECOMMENDATION 4
Currently requires around the clock nursing care for all ADLs. Daughter agreed to have him returned to [facility] since she is unable to care for him and has no room to do so in her apartment.

## 2017-12-12 NOTE — DISCHARGE NOTE ADULT - MEDICATION SUMMARY - MEDICATIONS TO STOP TAKING
I will STOP taking the medications listed below when I get home from the hospital:    amoxicillin-clavulanate 875 mg-125 mg oral tablet  -- 1 tab(s) by mouth every 12 hours

## 2017-12-12 NOTE — DISCHARGE NOTE ADULT - CARE PROVIDER_API CALL
Juan Vazquez), Hospitalists  [address] [place] [address]  Phone: [phone]  Fax: [phone]

## 2017-12-12 NOTE — PROGRESS NOTE ADULT - PROBLEM SELECTOR PLAN 4
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

## 2017-12-12 NOTE — DISCHARGE NOTE ADULT - SECONDARY DIAGNOSIS.
Dysphagia, unspecified type Anemia due to blood loss Essential hypertension Hypothyroidism Seizure disorder Hypokalemia

## 2017-12-12 NOTE — CONSULT NOTE ADULT - PROBLEM SELECTOR RECOMMENDATION 9
Currently on ampicillin/sulbactam. Minimally symptomatic. Tolerating room air.  Management as per primary team.

## 2017-12-12 NOTE — DISCHARGE NOTE ADULT - PATIENT PORTAL LINK FT
“You can access the [facility] Patient Portal, offered by [facility], by registering with the following website: [email or web address]”

## 2017-12-12 NOTE — CONSULT NOTE ADULT - SUBJECTIVE AND OBJECTIVE BOX
SHARRON TOBIN          MRN-[id number]            (10/18/1934)    HPI:  Patient is a 83 year old male with past medical history of obstructive hydrocephalus, dementia (AAOx0), dysphagia, CKD, urinary retention w/ suprapubic catheter, DM, hypothyroidism, HTN, HLD, seizure disorder who was brought in from NH after having what is reported to be one episode of medium sized coffee ground vomitus and fever. Pt was previously admitted to [facility] in 2016 for concerns of aspiration pneumonia and UTI but found to have contaminated urine cultures and treatment was discontinued. Of note, the patient is nonverbal at baseline.     In the ED Vitals were TMax 104, HR 89-98, -151/70-97, RR 18-24, SpO2 98% on 2L NC. Labs Notable for Anemia (Hemoglobin 10.8), No Leukocytosis but with Elevated PMNs 88.7%. Decreased Bicarbonate 20, Elevated Creatinine 1.58, Mildly Elevated AST, Urinalysis Consistent with UTI. CXR without Acute Infiltrate. No EKG Performed. Patient given Vancomycin 1g IV x 1, Zosyn 3.375g IV x 1., NS 500mL IV x 1. Blood Cultures, Urine Culture Obtained. (09 Dec 2017 14:40)    PAST MEDICAL & SURGICAL HISTORY:  Hypokalemia  Vitamin D deficiency  Muscle weakness (generalized)  UTI (urinary tract infection)  Epilepsy  Hypothyroidism  Dysphagia  HLD (hyperlipidemia)  Dementia  Chronic kidney disease: Chronic kidney disease (CKD)  Obstructive hydrocephalus: Hydrocephalus  Essential hypertension: HTN (hypertension)  Type 2 diabetes mellitus: DM (diabetes mellitus)  History of suprapubic catheter: present    FAMILY HISTORY: Reviewed and found non contributory to acute issues.    SOCIAL HISTORY: . Was living in [facility] as a long term resident. [employer]/Medicaid/ [employer] . Adult daughters involved in his care.    ROS:    Unable to attain due to: Medical condition                     Dyspnea (Jordan 0-10): 0                       N/V (Y/N): Unable to assess                              Secretions (Y/N): No                Agitation(Y/N): No  Pain (Y/N): PAINAD: 2 (mild)      -Provocation/Palliation:  Unable to assess          -Quality/Quantity: Unable to assess          -Radiating: Unable to assess          -Severity: Mild  -Timing/Frequency:  Unable to assess          -Impact on ADLs:  Unable to assess            Allergies: No Known Allergies or Intolerances    Opiate Naive (Y/N): Yes  -iStop reviewed (Y/N): Yes. No Rx found on iStop review. (Ref#: [id number] )    Medications: Reviewed  MEDICATIONS  (STANDING):  amLODIPine   Tablet 5 milliGRAM(s) Oral daily  ampicillin/sulbactam  IVPB 3 Gram(s) IV Intermittent every 8 hours  aspirin enteric coated 81 milliGRAM(s) Oral daily  atorvastatin 10 milliGRAM(s) Oral at bedtime  dextrose 5%. 1000 milliLiter(s) (50 mL/Hr) IV Continuous <Continuous>  dextrose 50% Injectable 12.5 Gram(s) IV Push once  dextrose 50% Injectable 25 Gram(s) IV Push once  dextrose 50% Injectable 25 Gram(s) IV Push once  enalaprilat Injectable 2.5 milliGRAM(s) IV Push every 6 hours  heparin  Injectable 5000 Unit(s) SubCutaneous every 8 hours  insulin lispro (HumaLOG) corrective regimen sliding scale   SubCutaneous Before meals and at bedtime  levETIRAcetam  IVPB 500 milliGRAM(s) IV Intermittent every 12 hours  levothyroxine Injectable 25 MICROGram(s) IV Push daily  metoprolol    tartrate Injectable 5 milliGRAM(s) IV Push every 6 hours  pantoprazole  Injectable 40 milliGRAM(s) IV Push two times a day  sodium chloride 0.9%. 1000 milliLiter(s) (75 mL/Hr) IV Continuous <Continuous>    MEDICATIONS  (PRN):  acetaminophen   Tablet 650 milliGRAM(s) Oral every 6 hours PRN For Temp greater than 38 C (100.4 F)  dextrose Gel 1 Dose(s) Oral once PRN Blood Glucose LESS THAN 70 milliGRAM(s)/deciliter  glucagon  Injectable 1 milliGRAM(s) IntraMuscular once PRN Glucose LESS THAN 70 milligrams/deciliter    Labs:  Reviewed  CBC:                        10.4   8.6   )-----------( 343      ( 12 Dec 2017 11:34 )             30.9     CMP:    12-12  142  |  105  |  10  ----------------------------<  156<H>  3.2<L>   |  16<L>  |  1.21  Ca    8.7      12 Dec 2017 11:34  Mg     1.7     12-12  Albumin, Serum: 2.8 g/dL (12.10.17 @ 08:27)  Albumin, Serum: 2.7 g/dL (16 @ 06:03)    POCT Blood Glucose.: 138 mg/dL (17 @ 12:08)    Acute Hepatitis Panel (17 @ 08:53)    Hepatitis C Virus Interpretation: Nonreact    Hepatitis C Virus S/CO Ratio: 0.23 S/CO    Hepatitis B Core IgM Antibody: Nonreact    Hepatitis B Surface Antigen: Nonreact    Hepatitis A IgM Antibody: Nonreact    Type + Screen (17 @ 05:30)    ABO Interpretation: A    Rh Interpretation: Positive    Antibody Screen: Negative    Vitamin B12, Serum: 612 pg/mL (12.10.17 @ 22:52)    Folate, Serum: 18.2 ng/mL (12.10.17 @ 22:52)    Thyroid Stimulating Hormone, Serum: 1.940 uIU/mL (12.10.17 @ 08:27)    Iron with Total Binding Capacity (12.10.17 @ 08:29)    Iron - Total Binding Capacity.: 171 ug/dL    % Saturation, Iron: 11 %    Iron Total, Serum: 19 ug/dL    Unsaturated Iron Binding Capacity: 152 ug/dL  Ferritin, Serum: 325.6 ng/mL (12.10.17 @ 08:29)    Rapid RVP Result: NotDetec  (17 @ 14:51)    Culture - Blood (17 @ 14:40)    Gram Stain:   Aerobic Bottle: Gram Positive Cocci in Clusters    -  Coagulase negative Staphylococcus: Detec    Specimen Source: .Blood Blood-Venous    Organism: Blood Culture PCR    Culture Results:   Growth in aerobic bottle: Coag Negative Staphylococcus  Susceptibility to follow.    Organism Identification: Blood Culture PCR    Method Type: PCR    Culture - Blood (17 @ 14:40)    Specimen Source: .Blood Blood-Venous    Culture Results:   No growth at 2 days.    Culture - Urine (17 @ 14:32)    -  Nitrofurantoin: S <=32    -  Vancomycin: S 2    -  Ampicillin: S <=2    -  Ciprofloxacin: S <=1    -  Tetra/Doxy: S <=4    Specimen Source: .Urine Clean Catch (Midstream)    Culture Results:   45,000 CFU/ml Enterococcus faecalis    Organism Identification: Enterococcus faecalis    Organism: Enterococcus faecalis    Method Type: JASON    Lactate, Blood: 1.7 mmoL/L (17 @ 12:47)    Imaging:  Reviewed     EXAM:  XR CHEST 1 VIEW PORT ROUTINE                        PROCEDURE DATE:  2017    INTERPRETATION:  Clinical History: Sepsis  Portable examination the chest demonstrates the heart to be within normal   limits in transverse diameter. Patient's head obscures the right upper   lung field. Prominent bronchovascular markings. Right basilar infiltrates   cannot be excluded.  Impression: Prominent bronchovascular markings. Right basilar infiltrates   cannot be excluded    ECG  Ventricular Rate 91 BPM  Atrial Rate 106 BPM  QRS Duration 120 ms  Q-T Interval 382 ms  QTC Calculation(Bezet) 469 ms  R Axis 73 degrees  T Axis 41 degrees  Diagnosis Line *** Poor data quality, interpretation may be adversely affected  Normal sinus rhythm  Right bundle branch block  Abnormal ECG    PEx:  T(C): 36.5 (17 @ 08:57), Max: 37.1 (17 @ 16:15)  HR: 71 (17 @ 11:32) (65 - 79)  BP: 163/84 (17 @ 11:32) (152/82 - 172/87)  RR: 16 (17 @ 08:57) (16 - 18)  SpO2: 95% (17 @ 08:57) (95% - 96%)  Wt(kg): 60.9  Daily Weight in k (11 Dec 2017 13:51)  CAPILLARY BLOOD GLUCOSE  POCT Blood Glucose.: 138 mg/dL (12 Dec 2017 12:08)  I&O's Summary  11 Dec 2017 07:  -  12 Dec 2017 07:00  --------------------------------------------------------  IN: 225 mL / OUT: 5150 mL / NET: -4925 mL    12 Dec 2017 07:  -  12 Dec 2017 13:33  --------------------------------------------------------  IN: 0 mL / OUT: 250 mL / NET: -250 mL    General: Elderly well appearing man laying in bed in NAD.   HEENT:  Temporal wasting PERRL MOM   Neck: Secretions auscultated No masses  CVS: RR S1S2  Resp: Unlabored CTAB anteriorly Decreased inspiratory efforts.  GI:  Soft NT ND BS+  : Abraham+ clear yellow urine   Musc: No cyanosis or edema. Contracted/knees pulled up.    Neuro: Not following commands   Psych: Calm    Skin: Intact. Dystrophic toenails  Lymph: Normal  Preadmit Karnofsky: 40%           Current Karnofsky:  30%  Cachexia (Y/N): No    Advanced Directives:     Full Code     HCP    Decision maker: The patient does not demonstrate capacity for complex medical decision making given medical condition.  Legal surrogate: Daughters Romy Castellano [phone] and Nick Tobin [phone] are the named HCP agents. Copy of HCP found in [facility], copy placed in paper chart.     GOALS OF CARE DISCUSSION       Palliative care info/counseling provided	           Family meeting performed over the phone       Advanced Directives addressed please see Advance Care Planning Note	           See previous Palliative Medicine Note from 2016       Documentation of GOC: Full code          AGENCIES DISCUSSED       [facility] w/ hospice services by [facility]    REFERRALS	        Palliative Med        Unit SW/Case Mgmt        - Hindu       Speech/Swallow - Following       Massage Therapy        Music Therapy       Hospice - [facility]

## 2017-12-12 NOTE — CONSULT NOTE ADULT - PROBLEM SELECTOR RECOMMENDATION 3
Currently Fast scale 7E. Diagnosis would provide the patient with hospice benefit given decline seen in the last year. Prognosis of months (<6).

## 2017-12-12 NOTE — DISCHARGE NOTE ADULT - MEDICATION SUMMARY - MEDICATIONS TO TAKE
I will START or STAY ON the medications listed below when I get home from the hospital:    aspirin 81 mg oral tablet  -- 1 tab(s) by mouth once a day  -- Indication: For Prophylaxis    acetaminophen 325 mg oral tablet  -- 2 tab(s) by mouth every 6 hours, As needed, For Temp greater than 38 C (100.4 F)  -- Indication: For Pain and Fever control    cloNIDine 0.1 mg/24 hr transdermal film, extended release  -- 1 patch by transdermal patch every 7 days  -- Indication: For Hypertension    levETIRAcetam 500 mg oral tablet  -- 1 tab(s) by mouth 2 times a day  -- Indication: For Seizure disorder    HumaLOG 100 units/mL subcutaneous solution  -- 4 unit(s) subcutaneous 3 times a day (with meals)  -- Indication: For Diabetes    Levemir  -- 10 international unit(s) subcutaneous once a day  -- Indication: For Diabetes    Levemir 100 units/mL subcutaneous solution  -- 35 unit(s) subcutaneous once a day (at bedtime)  -- Indication: For Diabetes    atorvastatin 10 mg oral tablet  -- 1 tab(s) by mouth once a day (at bedtime)  -- Indication: For HLD (hyperlipidemia)    metoprolol tartrate 100 mg oral tablet  -- 1 tab(s) by mouth 2 times a day  -- Indication: For Hypertension    amLODIPine 5 mg oral tablet  -- 1 tab(s) by mouth once a day  -- Indication: For Hypertension    potassium chloride 20 mEq oral powder for reconstitution  --  by mouth once a day  -- Indication: For Hypokalemia    levothyroxine 50 mcg (0.05 mg) oral tablet  -- 1 tab(s) by mouth once a day  -- Indication: For Hypothyroidism    hydrALAZINE 10 mg oral tablet  -- 1 tab(s) by mouth 2 times a day  -- Indication: For Hypertension    Vitamin D3 400 intl units oral tablet  -- 1 tab(s) by mouth once a day  -- Indication: For Prophylaxis I will START or STAY ON the medications listed below when I get home from the hospital:    aspirin 81 mg oral tablet  -- 1 tab(s) by mouth once a day  -- Indication: For Prophylaxis    acetaminophen 325 mg oral tablet  -- 2 tab(s) by mouth every 6 hours, As needed, For Temp greater than 38 C (100.4 F)  -- Indication: For Pain and Fever control    cloNIDine 0.1 mg/24 hr transdermal film, extended release  -- 1 patch by transdermal patch every 7 days  -- Indication: For Hypertension    levETIRAcetam 500 mg oral tablet  -- 1 tab(s) by mouth 2 times a day  -- Indication: For Seizure disorder    HumaLOG 100 units/mL subcutaneous solution  -- 4 unit(s) subcutaneous 3 times a day (with meals)  -- Indication: For Diabetes    Levemir  -- 10 international unit(s) subcutaneous once a day  -- Indication: For Diabetes    Levemir 100 units/mL subcutaneous solution  -- 35 unit(s) subcutaneous once a day (at bedtime)  -- Indication: For Diabetes    atorvastatin 10 mg oral tablet  -- 1 tab(s) by mouth once a day (at bedtime)  -- Indication: For HLD (hyperlipidemia)    metoprolol tartrate 100 mg oral tablet  -- 1 tab(s) by mouth 2 times a day  -- Indication: For Hypertension    amLODIPine 5 mg oral tablet  -- 1 tab(s) by mouth once a day  -- Indication: For Hypertension    potassium chloride 20 mEq oral powder for reconstitution  -- 40 milliequivalent(s) by mouth once a day  -- Indication: For Low potassium    levothyroxine 50 mcg (0.05 mg) oral tablet  -- 1 tab(s) by mouth once a day  -- Indication: For Hypothyroidism    hydrALAZINE 10 mg oral tablet  -- 1 tab(s) by mouth 2 times a day  -- Indication: For Hypertension    Vitamin D3 400 intl units oral tablet  -- 1 tab(s) by mouth once a day  -- Indication: For Prophylaxis

## 2017-12-12 NOTE — PROGRESS NOTE ADULT - SUBJECTIVE AND OBJECTIVE BOX
83M non-verbal with obstructive hydrocephalus, dementia, dysphagia, CKD, urinary retention w/ suprapubic catheter, DM, Hypothyroid, HTN, HLD, seizure disorder presenting with episode of coffee ground emesis and admitted for UGIB and aspiration PNA    O/N Events: Spoke at length with family last night about dysphagia screening and goals of care. Patient's daughter wanted to think about what to do but is leaning more towards pursuing Hospice at this time because of past experience with mother. Asked to speak to Palliative Care team today if they could call her. She works full time job and is unable to answer phone calls but will call back during breaks.  Subjective/ROS: Patient is non verbal and unable to express any complaints    VITALS  Vital Signs Last 24 Hrs  T(C): 36.8 (12 Dec 2017 04:37), Max: 37.1 (11 Dec 2017 16:15)  T(F): 98.3 (12 Dec 2017 04:37), Max: 98.7 (11 Dec 2017 16:15)  HR: 74 (12 Dec 2017 04:37) (65 - 77)  BP: 163/95 (12 Dec 2017 04:37) (162/87 - 172/87)  BP(mean): --  RR: 16 (12 Dec 2017 04:37) (16 - 18)  SpO2: 96% (12 Dec 2017 04:37) (96% - 97%)    CAPILLARY BLOOD GLUCOSE      POCT Blood Glucose.: 146 mg/dL (12 Dec 2017 07:27)  POCT Blood Glucose.: 127 mg/dL (11 Dec 2017 21:02)  POCT Blood Glucose.: 202 mg/dL (11 Dec 2017 17:17)  POCT Blood Glucose.: 127 mg/dL (11 Dec 2017 11:39)      PHYSICAL EXAM  General: A&Ox0; NAD  Head: NC/AT; dry MM; PERRL; EOMI;  Neck: Supple; no JVD  Respiratory: Crackles heard at bases with bronchial rhonchi   Cardiovascular: Regular rhythm/rate; S1/S2   Gastrointestinal: Soft; NTND; normoactive BS - patient grunting during abdominal exam   Back: No CVA tenderness, no sign of pressure ulcer or skin lesion   Extremities: WWP; no edema/cyanosis  Neurological:  CNII-XII grossly intact; no obvious focal deficits    MEDICATIONS  (STANDING):  amLODIPine   Tablet 5 milliGRAM(s) Oral daily  ampicillin/sulbactam  IVPB 1.5 Gram(s) IV Intermittent every 6 hours  aspirin enteric coated 81 milliGRAM(s) Oral daily  atorvastatin 10 milliGRAM(s) Oral at bedtime  dextrose 5%. 1000 milliLiter(s) (50 mL/Hr) IV Continuous <Continuous>  dextrose 50% Injectable 12.5 Gram(s) IV Push once  dextrose 50% Injectable 25 Gram(s) IV Push once  dextrose 50% Injectable 25 Gram(s) IV Push once  enalaprilat Injectable 1.25 milliGRAM(s) IV Push every 6 hours  heparin  Injectable 5000 Unit(s) SubCutaneous every 8 hours  insulin lispro (HumaLOG) corrective regimen sliding scale   SubCutaneous Before meals and at bedtime  levETIRAcetam  IVPB 500 milliGRAM(s) IV Intermittent every 12 hours  levothyroxine Injectable 25 MICROGram(s) IV Push daily  metoprolol    tartrate Injectable 5 milliGRAM(s) IV Push every 6 hours  pantoprazole  Injectable 40 milliGRAM(s) IV Push two times a day  sodium chloride 0.9%. 1000 milliLiter(s) (75 mL/Hr) IV Continuous <Continuous>    MEDICATIONS  (PRN):  acetaminophen   Tablet 650 milliGRAM(s) Oral every 6 hours PRN For Temp greater than 38 C (100.4 F)  dextrose Gel 1 Dose(s) Oral once PRN Blood Glucose LESS THAN 70 milliGRAM(s)/deciliter  glucagon  Injectable 1 milliGRAM(s) IntraMuscular once PRN Glucose LESS THAN 70 milligrams/deciliter      No Known Allergies      LABS                        9.8    10.3  )-----------( 247      ( 11 Dec 2017 08:53 )             30.4     12-11    143  |  109<H>  |  12  ----------------------------<  151<H>  3.4<L>   |  18<L>  |  1.49<H>    Ca    7.8<L>      11 Dec 2017 08:53  Mg     1.7     12-11      Culture - Blood (12.09.17 @ 14:40)    Gram Stain:   Aerobic Bottle: Gram Positive Cocci in Clusters    -  Coagulase negative Staphylococcus: Detec    Specimen Source: .Blood Blood-Venous    Organism: Blood Culture PCR    Culture Results:   Culture in progress    Organism Identification: Blood Culture PCR    Method Type: PCR

## 2017-12-12 NOTE — CONSULT NOTE ADULT - PROBLEM SELECTOR RECOMMENDATION 6
Discussed the benefit that hospice will be able to provide to the patient at his current residence since he would not qualify for inpatient level of care. Discussed the ultimate decline that could be anticipated and discussed the possibility of limiting medical interventions. She agreed to have additional support at his nursing home, but would want to continue Abx, fluids, returning to hospital if needed, as well as resuscitations. She will be at bedside later tonight to review MOLST left at bedside and was given my contact information if any questions. Palliative SW made initial referral contact with [facility] since they provide hospice services at his facility.

## 2017-12-12 NOTE — DISCHARGE NOTE ADULT - CARE PLAN
Principal Discharge DX:	Aspiration pneumonia of right lower lobe due to vomit  Goal:	To treat  Instructions for follow-up, activity and diet:	You were admitted to the hospital secondary to having an episode of bloody vomitus at your nursing home. During this episode you may have aspirated which resulted in an infection in your lung. You were initially treated with Vancomycin and Zosyn and then switched to Unasyn for your infection. You will need to continue antibiotics for 3 more days to complete your 7 day course.  Secondary Diagnosis:	Dysphagia, unspecified type  Instructions for follow-up, activity and diet:	During your stay you were found to have difficulty swallowing. Our speech and swallow team were unable to fully assess your swallow. During talks with our palliative team your HCP agreed it would be best to allow you to continue to eat anyway with comfort feedings.  Secondary Diagnosis:	Anemia due to blood loss  Instructions for follow-up, activity and diet:	During your admission you had an acute drop in hemoglobin which stabilized. You most likely had this secondary to an Upper GI bleed. It is recommended you see a gastroenterologist as an outpatient to get an esophageal gastroduodenal endoscopy. Principal Discharge DX:	Aspiration pneumonia of right lower lobe due to vomit  Goal:	To treat  Instructions for follow-up, activity and diet:	You were admitted to the hospital secondary to having an episode of bloody vomitus at your nursing home. During this episode you may have aspirated which resulted in an infection in your lung. You were initially treated with Vancomycin and Zosyn and then switched to Unasyn for your infection.  Secondary Diagnosis:	Dysphagia, unspecified type  Instructions for follow-up, activity and diet:	During your stay you were found to have difficulty swallowing. Our speech and swallow team were unable to fully assess your swallow. During talks with our palliative team your HCP agreed it would be best to allow you to continue to eat anyway with comfort feedings.  Secondary Diagnosis:	Anemia due to blood loss  Instructions for follow-up, activity and diet:	During your admission you had an acute drop in hemoglobin which stabilized. You most likely had this secondary to an Upper GI bleed. It is recommended you see a gastroenterologist as an outpatient to get an esophageal gastroduodenal endoscopy. Principal Discharge DX:	Aspiration pneumonia of right lower lobe due to vomit  Goal:	To treat  Instructions for follow-up, activity and diet:	You were admitted to the hospital secondary to having an episode of bloody vomitus at your nursing home. During this episode you may have aspirated which resulted in an infection in your lung. You were initially treated with Vancomycin and Zosyn and then switched to Unasyn for your infection.  Secondary Diagnosis:	Dysphagia, unspecified type  Instructions for follow-up, activity and diet:	During your stay you were found to have difficulty swallowing. Our speech and swallow team were unable to fully assess your swallow. During talks with our palliative team your HCP agreed it would be best to allow you to continue to eat anyway with comfort feedings even though there is a risk of aspiration.  Secondary Diagnosis:	Anemia due to blood loss  Instructions for follow-up, activity and diet:	During your admission you had an acute drop in hemoglobin which stabilized. You most likely had this secondary to an Upper GI bleed. It is recommended you see a gastroenterologist as an outpatient to get an esophageal gastroduodenal endoscopy  Secondary Diagnosis:	Essential hypertension  Goal:	Medication compliance  Instructions for follow-up, activity and diet:	Please continue your home medications  Secondary Diagnosis:	Hypothyroidism  Goal:	Medication compliance  Instructions for follow-up, activity and diet:	Please continue your home medications  Secondary Diagnosis:	Seizure disorder  Goal:	Medication compliance  Instructions for follow-up, activity and diet:	Please continue your home medications Principal Discharge DX:	Aspiration pneumonia of right lower lobe due to vomit  Goal:	To treat  Instructions for follow-up, activity and diet:	You were admitted to the hospital secondary to having an episode of bloody vomitus at your nursing home. During this episode you may have aspirated which resulted in an infection in your lung. You were initially treated with Vancomycin and Zosyn and then switched to Unasyn for your infection for total 5 days of antibiotics.  Secondary Diagnosis:	Dysphagia, unspecified type  Instructions for follow-up, activity and diet:	During your stay you were found to have difficulty swallowing. Our speech and swallow team were unable to fully assess your swallow. During talks with our palliative team your HCP agreed it would be best to allow you to continue to eat anyway with comfort feedings even though there is a risk of aspiration.  Secondary Diagnosis:	Anemia due to blood loss  Instructions for follow-up, activity and diet:	During your admission you had an acute drop in hemoglobin which stabilized. You most likely had this secondary to an Upper GI bleed. It is recommended you see a gastroenterologist as an outpatient to get an esophageal gastroduodenal endoscopy  Secondary Diagnosis:	Essential hypertension  Goal:	Medication compliance  Instructions for follow-up, activity and diet:	Please continue your home medications Metoprolol, Hydralazine and Amlodipine.   We started you on 0.1 Clonidine patch for high blood pressure given your difficulty with swallowing. Please make further changes as needed with your PCP  Secondary Diagnosis:	Hypothyroidism  Goal:	Medication compliance  Instructions for follow-up, activity and diet:	Please continue your home medications  Secondary Diagnosis:	Seizure disorder  Goal:	Medication compliance  Instructions for follow-up, activity and diet:	Please continue your home medications Principal Discharge DX:	Aspiration pneumonia of right lower lobe due to vomit  Goal:	To treat  Instructions for follow-up, activity and diet:	You were admitted to the hospital secondary to having an episode of bloody vomitus at your nursing home. During this episode you may have aspirated which resulted in an infection in your lung. You were initially treated with Vancomycin and Zosyn and then switched to Unasyn for your infection for total 5 days of antibiotics.  Secondary Diagnosis:	Dysphagia, unspecified type  Instructions for follow-up, activity and diet:	During your stay you were found to have difficulty swallowing. Our speech and swallow team were unable to fully assess your swallow. During talks with our palliative team your HCP agreed it would be best to allow you to continue to eat anyway with comfort feedings even though there is a risk of aspiration.  Secondary Diagnosis:	Anemia due to blood loss  Instructions for follow-up, activity and diet:	During your admission you had an acute drop in hemoglobin which stabilized. You most likely had this secondary to an Upper GI bleed. It is recommended you see a gastroenterologist as an outpatient to get an esophageal gastroduodenal endoscopy  Secondary Diagnosis:	Essential hypertension  Goal:	Medication compliance  Instructions for follow-up, activity and diet:	Please continue your home medications Metoprolol, Hydralazine and Amlodipine.   We started you on 0.1 Clonidine patch for high blood pressure given your difficulty with swallowing. Please make further changes as needed with your PCP  Secondary Diagnosis:	Hypothyroidism  Goal:	Medication compliance  Instructions for follow-up, activity and diet:	Please continue your home medications  Secondary Diagnosis:	Seizure disorder  Goal:	Medication compliance  Instructions for follow-up, activity and diet:	Please continue your home medications  Secondary Diagnosis:	Hypokalemia  Goal:	Medication compliance  Instructions for follow-up, activity and diet:	Your potassium was consistently low during this admission. We have increased your daily potassium powder dose to 40meq daily.

## 2017-12-12 NOTE — CONSULT NOTE ADULT - PROBLEM SELECTOR RECOMMENDATION 5
Advance care planning meeting  Start time: 11:55am  End time: 12:30pm  Total time: 35min  A face to face meeting to discuss advance care planning was held today regarding: SHARRON TOBIN  Primary decision maker: Romy Castellano [phone]  Alternate/surrogate: Nick Tobin [phone] (Not present)  Discussed advance directives including, but not limited to, healthcare proxy and code status.  Decision regarding code status: Full code  Documentation completed today: None

## 2017-12-12 NOTE — CONSULT NOTE ADULT - PROBLEM SELECTOR RECOMMENDATION 7
Support provided to patient and family. Patient to have access to supportive services during rest of hospital stay as the patient/family deemed necessary ie. Chaplaincy, Massage therapy, Music therapy, Patient and family supportive services, Palliative SW, etc.    As discussed during the palliative IDT meeting and as identified during the patients PSSA screening the patient would benefit from palliative SW, Chaplaincy visits, , and ongoing support.

## 2017-12-12 NOTE — PROGRESS NOTE ADULT - PROBLEM SELECTOR PLAN 3
Patient with a history of dysphagia. Unable to pass Speech and Pathology screening due to patient not being able to tolerate water and follow directions of test. Spoke last night with daughter about options and treatments.  -Palliative Consult for GOC conversation with family member

## 2017-12-13 LAB
ANION GAP SERPL CALC-SCNC: 19 MMOL/L — HIGH (ref 5–17)
BUN SERPL-MCNC: 11 MG/DL — SIGNIFICANT CHANGE UP (ref 7–23)
CALCIUM SERPL-MCNC: 9.2 MG/DL — SIGNIFICANT CHANGE UP (ref 8.4–10.5)
CHLORIDE SERPL-SCNC: 109 MMOL/L — HIGH (ref 96–108)
CO2 SERPL-SCNC: 18 MMOL/L — LOW (ref 22–31)
CREAT SERPL-MCNC: 1.26 MG/DL — SIGNIFICANT CHANGE UP (ref 0.5–1.3)
GLUCOSE BLDC GLUCOMTR-MCNC: 143 MG/DL — HIGH (ref 70–99)
GLUCOSE BLDC GLUCOMTR-MCNC: 155 MG/DL — HIGH (ref 70–99)
GLUCOSE BLDC GLUCOMTR-MCNC: 171 MG/DL — HIGH (ref 70–99)
GLUCOSE BLDC GLUCOMTR-MCNC: 191 MG/DL — HIGH (ref 70–99)
GLUCOSE SERPL-MCNC: 194 MG/DL — HIGH (ref 70–99)
POTASSIUM SERPL-MCNC: 3.2 MMOL/L — LOW (ref 3.5–5.3)
POTASSIUM SERPL-SCNC: 3.2 MMOL/L — LOW (ref 3.5–5.3)
SODIUM SERPL-SCNC: 146 MMOL/L — HIGH (ref 135–145)

## 2017-12-13 PROCEDURE — 99233 SBSQ HOSP IP/OBS HIGH 50: CPT

## 2017-12-13 PROCEDURE — 99233 SBSQ HOSP IP/OBS HIGH 50: CPT | Mod: GC

## 2017-12-13 RX ORDER — LEVETIRACETAM 250 MG/1
500 TABLET, FILM COATED ORAL EVERY 12 HOURS
Qty: 0 | Refills: 0 | Status: DISCONTINUED | OUTPATIENT
Start: 2017-12-13 | End: 2017-12-14

## 2017-12-13 RX ORDER — SERTRALINE 25 MG/1
50 TABLET, FILM COATED ORAL DAILY
Qty: 0 | Refills: 0 | Status: DISCONTINUED | OUTPATIENT
Start: 2017-12-13 | End: 2017-12-15

## 2017-12-13 RX ORDER — LEVOTHYROXINE SODIUM 125 MCG
50 TABLET ORAL DAILY
Qty: 0 | Refills: 0 | Status: DISCONTINUED | OUTPATIENT
Start: 2017-12-13 | End: 2017-12-13

## 2017-12-13 RX ORDER — AMPICILLIN SODIUM AND SULBACTAM SODIUM 250; 125 MG/ML; MG/ML
3 INJECTION, POWDER, FOR SUSPENSION INTRAMUSCULAR; INTRAVENOUS ONCE
Qty: 0 | Refills: 0 | Status: COMPLETED | OUTPATIENT
Start: 2017-12-13 | End: 2017-12-13

## 2017-12-13 RX ORDER — POTASSIUM CHLORIDE 20 MEQ
10 PACKET (EA) ORAL
Qty: 0 | Refills: 0 | Status: COMPLETED | OUTPATIENT
Start: 2017-12-13 | End: 2017-12-13

## 2017-12-13 RX ORDER — METOPROLOL TARTRATE 50 MG
5 TABLET ORAL EVERY 6 HOURS
Qty: 0 | Refills: 0 | Status: DISCONTINUED | OUTPATIENT
Start: 2017-12-13 | End: 2017-12-14

## 2017-12-13 RX ORDER — POTASSIUM CHLORIDE 20 MEQ
40 PACKET (EA) ORAL EVERY 4 HOURS
Qty: 0 | Refills: 0 | Status: DISCONTINUED | OUTPATIENT
Start: 2017-12-13 | End: 2017-12-13

## 2017-12-13 RX ORDER — LEVOTHYROXINE SODIUM 125 MCG
25 TABLET ORAL DAILY
Qty: 0 | Refills: 0 | Status: DISCONTINUED | OUTPATIENT
Start: 2017-12-13 | End: 2017-12-14

## 2017-12-13 RX ORDER — POTASSIUM CHLORIDE 20 MEQ
40 PACKET (EA) ORAL ONCE
Qty: 0 | Refills: 0 | Status: COMPLETED | OUTPATIENT
Start: 2017-12-13 | End: 2017-12-13

## 2017-12-13 RX ORDER — LEVOTHYROXINE SODIUM 125 MCG
25 TABLET ORAL DAILY
Qty: 0 | Refills: 0 | Status: DISCONTINUED | OUTPATIENT
Start: 2017-12-13 | End: 2017-12-13

## 2017-12-13 RX ORDER — HYDRALAZINE HCL 50 MG
10 TABLET ORAL
Qty: 0 | Refills: 0 | Status: DISCONTINUED | OUTPATIENT
Start: 2017-12-13 | End: 2017-12-13

## 2017-12-13 RX ORDER — METOPROLOL TARTRATE 50 MG
100 TABLET ORAL
Qty: 0 | Refills: 0 | Status: DISCONTINUED | OUTPATIENT
Start: 2017-12-13 | End: 2017-12-13

## 2017-12-13 RX ORDER — LEVETIRACETAM 250 MG/1
500 TABLET, FILM COATED ORAL
Qty: 0 | Refills: 0 | Status: DISCONTINUED | OUTPATIENT
Start: 2017-12-13 | End: 2017-12-13

## 2017-12-13 RX ADMIN — LEVETIRACETAM 420 MILLIGRAM(S): 250 TABLET, FILM COATED ORAL at 19:30

## 2017-12-13 RX ADMIN — HEPARIN SODIUM 5000 UNIT(S): 5000 INJECTION INTRAVENOUS; SUBCUTANEOUS at 22:19

## 2017-12-13 RX ADMIN — Medication 2.5 MILLIGRAM(S): at 00:33

## 2017-12-13 RX ADMIN — AMPICILLIN SODIUM AND SULBACTAM SODIUM 200 GRAM(S): 250; 125 INJECTION, POWDER, FOR SUSPENSION INTRAMUSCULAR; INTRAVENOUS at 19:44

## 2017-12-13 RX ADMIN — AMPICILLIN SODIUM AND SULBACTAM SODIUM 200 GRAM(S): 250; 125 INJECTION, POWDER, FOR SUSPENSION INTRAMUSCULAR; INTRAVENOUS at 11:13

## 2017-12-13 RX ADMIN — LEVETIRACETAM 420 MILLIGRAM(S): 250 TABLET, FILM COATED ORAL at 05:36

## 2017-12-13 RX ADMIN — Medication 2.5 MILLIGRAM(S): at 10:57

## 2017-12-13 RX ADMIN — SODIUM CHLORIDE 75 MILLILITER(S): 9 INJECTION INTRAMUSCULAR; INTRAVENOUS; SUBCUTANEOUS at 18:30

## 2017-12-13 RX ADMIN — Medication 5 MILLIGRAM(S): at 10:57

## 2017-12-13 RX ADMIN — Medication 2.5 MILLIGRAM(S): at 18:49

## 2017-12-13 RX ADMIN — Medication 1: at 22:18

## 2017-12-13 RX ADMIN — HEPARIN SODIUM 5000 UNIT(S): 5000 INJECTION INTRAVENOUS; SUBCUTANEOUS at 05:36

## 2017-12-13 RX ADMIN — Medication 100 MILLIEQUIVALENT(S): at 18:28

## 2017-12-13 RX ADMIN — HEPARIN SODIUM 5000 UNIT(S): 5000 INJECTION INTRAVENOUS; SUBCUTANEOUS at 13:49

## 2017-12-13 RX ADMIN — PANTOPRAZOLE SODIUM 40 MILLIGRAM(S): 20 TABLET, DELAYED RELEASE ORAL at 18:28

## 2017-12-13 RX ADMIN — Medication 5 MILLIGRAM(S): at 00:33

## 2017-12-13 RX ADMIN — Medication 1: at 18:29

## 2017-12-13 RX ADMIN — PANTOPRAZOLE SODIUM 40 MILLIGRAM(S): 20 TABLET, DELAYED RELEASE ORAL at 05:35

## 2017-12-13 RX ADMIN — Medication 100 MILLIEQUIVALENT(S): at 22:18

## 2017-12-13 RX ADMIN — Medication 25 MICROGRAM(S): at 05:36

## 2017-12-13 RX ADMIN — Medication 1: at 08:48

## 2017-12-13 RX ADMIN — AMPICILLIN SODIUM AND SULBACTAM SODIUM 200 GRAM(S): 250; 125 INJECTION, POWDER, FOR SUSPENSION INTRAMUSCULAR; INTRAVENOUS at 05:35

## 2017-12-13 RX ADMIN — Medication 5 MILLIGRAM(S): at 18:49

## 2017-12-13 NOTE — PROGRESS NOTE ADULT - SUBJECTIVE AND OBJECTIVE BOX
SHARRON TOBIN             MRN-[id number]    CC: [facility], support    HPI:  Patient is a 83 year old male with past medical history of obstructive hydrocephalus, dementia (AAOx0), dysphagia, CKD, urinary retention w/ suprapubic catheter, DM, hypothyroidism, HTN, HLD, seizure disorder who was brought in from NH after having what is reported to be one episode of medium sized coffee ground vomitus and fever. Pt was previously admitted to [facility] in 2016 for concerns of aspiration pneumonia and UTI but found to have contaminated urine cultures and treatment was discontinued. Of note, the patient is nonverbal at baseline.     In the ED Vitals were TMax 104, HR 89-98, -151/70-97, RR 18-24, SpO2 98% on 2L NC. Labs Notable for Anemia (Hemoglobin 10.8), No Leukocytosis but with Elevated PMNs 88.7%. Decreased Bicarbonate 20, Elevated Creatinine 1.58, Mildly Elevated AST, Urinalysis Consistent with UTI. CXR without Acute Infiltrate. No EKG Performed. Patient given Vancomycin 1g IV x 1, Zosyn 3.375g IV x 1., NS 500mL IV x 1. Blood Cultures, Urine Culture Obtained. (09 Dec 2017 14:40)    SUBJECTIVE: Saw and evaluated Mr Tobin at bedside. Found in NAD, but was able to signal that he felt cold. Otherwise he continues to track, but not engage meaningfully. As per bedside nursing aide the patient did not participate in his feeding and was not able to take his breakfast.     ROS:  DYSPNEA: No  N/V (Y/N): Unable to assess                              Secretions (Y/N): No                Agitation(Y/N): No  Pain (Y/N): PAINAD: 2 (mild)      -Provocation/Palliation:  Unable to assess          -Quality/Quantity: Unable to assess          -Radiating: Unable to assess          -Severity: Mild  -Timing/Frequency:  Unable to assess          -Impact on ADLs:  Unable to assess            OTHER REVIEW OF SYSTEMS: UNABLE TO OBTAIN  due to: medical condition    PEx:  T(C): 36.9 (12-13-17 @ 09:43), Max: 36.9 (12-12-17 @ 16:56)  HR: 87 (12-13-17 @ 11:26) (79 - 100)  BP: 137/80 (12-13-17 @ 11:26) (131/84 - 166/98)  RR: 20 (12-13-17 @ 09:43) (18 - 20)  SpO2: 94% (12-13-17 @ 09:43) (93% - 95%)  Wt(kg): 60    General: Elderly well appearing man laying in bed in mild distress, but consolable.    HEENT:  Temporal wasting PERRL MOM   Neck: Secretions auscultated No masses  CVS: RR S1S2  Resp: Unlabored CTAB anteriorly Decreased inspiratory efforts.  GI:  Soft NT ND BS+  : Abraham+ small amount of clear yellow urine   Musc: No cyanosis or edema. Contracted/knees pulled up.    Neuro: Not following commands   Psych: Calm    Skin: Intact. Dystrophic toenails  Lymph: Normal    ALLERGIES: No Known Allergies    OPIATE NAÏVE (Y/N): Yes    MEDICATIONS: REVIEWED  MEDICATIONS  (STANDING):  amLODIPine   Tablet 5 milliGRAM(s) Oral daily  ampicillin/sulbactam  IVPB 3 Gram(s) IV Intermittent every 8 hours  aspirin enteric coated 81 milliGRAM(s) Oral daily  atorvastatin 10 milliGRAM(s) Oral at bedtime  dextrose 5%. 1000 milliLiter(s) (50 mL/Hr) IV Continuous <Continuous>  dextrose 50% Injectable 12.5 Gram(s) IV Push once  dextrose 50% Injectable 25 Gram(s) IV Push once  dextrose 50% Injectable 25 Gram(s) IV Push once  enalaprilat Injectable 2.5 milliGRAM(s) IV Push every 6 hours  heparin  Injectable 5000 Unit(s) SubCutaneous every 8 hours  insulin lispro (HumaLOG) corrective regimen sliding scale   SubCutaneous Before meals and at bedtime  levETIRAcetam  IVPB 500 milliGRAM(s) IV Intermittent every 12 hours  levothyroxine Injectable 25 MICROGram(s) IV Push daily  metoprolol    tartrate Injectable 5 milliGRAM(s) IV Push every 6 hours  pantoprazole  Injectable 40 milliGRAM(s) IV Push two times a day  sodium chloride 0.9%. 1000 milliLiter(s) (75 mL/Hr) IV Continuous <Continuous>    MEDICATIONS  (PRN):  acetaminophen   Tablet 650 milliGRAM(s) Oral every 6 hours PRN For Temp greater than 38 C (100.4 F)  dextrose Gel 1 Dose(s) Oral once PRN Blood Glucose LESS THAN 70 milliGRAM(s)/deciliter  glucagon  Injectable 1 milliGRAM(s) IntraMuscular once PRN Glucose LESS THAN 70 milligrams/deciliter    LABS: REVIEWED                        10.4   8.6   )-----------( 343      ( 12 Dec 2017 11:34 )             30.9   12-13    146<H>  |  109<H>  |  11  ----------------------------<  194<H>  3.2<L>   |  18<L>  |  1.26    Ca    9.2      13 Dec 2017 08:56  Mg     1.7     12-12      IMAGING: REVIEWED    ADVANCED DIRECTIVES:     FULL CODE     Advanced Directives:     Full Code     HCP    Decision maker: The patient does not demonstrate capacity for complex medical decision making given medical condition.  Legal surrogate: Daughters Romy Castellano [phone] and Nick Tobin [phone] are the named HCP agents. Copy of HCP found in [facility], copy placed in paper chart.     GOALS OF CARE DISCUSSION       Palliative care info/counseling provided	           Family meeting performed over the phone yesterday, no family at bedside today.           See previous Palliative Medicine Note from 2016       Documentation of GOC: Full code          AGENCIES DISCUSSED       [facility] w/ hospice services by [facility]    REFERRALS	        Palliative Med        Unit SW/Case Mgmt        - Buddhism       Speech/Swallow - Following       Massage Therapy        Music Therapy       Hospice - [facility]

## 2017-12-13 NOTE — PROGRESS NOTE ADULT - PROBLEM SELECTOR PLAN 1
Currently on ampicillin/sulbactam. Minimally symptomatic. Tolerating room air. Plan to transition to PO antibiotics.  Management as per primary team.

## 2017-12-13 NOTE — PROGRESS NOTE ADULT - PROBLEM SELECTOR PLAN 3
Patient with a history of dysphagia. Unable to pass Speech and Pathology screening due to patient not being able to tolerate water and follow directions of test. Spoke last night with daughter about options and treatments.  -Palliative Consult for GOC conversation with family member Patient with a history of dysphagia. Unable to pass Speech and Pathology screening due to patient not being able to tolerate water and follow directions of test. Spoke with daughter about options and treatments. Daughter wants to continue feeds despite risks.  - Will switch to PO meds today.   -Palliative Consult for GOC conversation with family member Patient with a history of dysphagia. Unable to pass Speech and Pathology screening due to patient not being able to tolerate water and follow directions of test. Spoke with daughter about options and treatments. Daughter wants to continue feeds despite risks.  - Tried switching to PO meds today however pt does not follow commands   -Palliative Consult for GOC conversation with family member

## 2017-12-13 NOTE — PROGRESS NOTE ADULT - PROBLEM SELECTOR PLAN 7
Patient NPO currently and cannot take home medications.  -Hold Synthroid pending clearance of speech and swallow Will initiate home Synthroid

## 2017-12-13 NOTE — PROGRESS NOTE ADULT - PROBLEM SELECTOR PLAN 6
Patient NPO currently and cannot take home medications.  -Hold Atorvastatin pending clearance of speech and swallow - Will restart Atorvastatin

## 2017-12-13 NOTE — PROGRESS NOTE ADULT - PROBLEM SELECTOR PLAN 2
Patient Hgb dropped from 10.8 to 9.6, now 9.8. No episodes of hematemesis or bloody BMs as reported by nursing.  -Continue to trend daily CBC: can increase frequency if patient becomes tachy or suspect active bleed  -maintain active type and screen  -will transfuse if Hgb <7; currently 9.8  -Suspected source of loss Upper GI bleed associated with hematemesis. Unclear possible etiology (esophageal vs. peptic vs. duodenal). Patient hemoglobin currently stable and can follow up as outpatient with GI for endoscopy. Patient Hgb dropped from 10.8 to 9.6.  No episodes of hematemesis or bloody BMs as reported by nursing.  -Continue to trend daily CBC: can increase frequency if patient becomes tachy or suspect active bleed  -maintain active type and screen  -will transfuse if Hgb <7; currently 9.8  -Suspected source of loss Upper GI bleed associated with hematemesis. Unclear possible etiology (esophageal vs. peptic vs. duodenal). Patient hemoglobin currently stable and can follow up as outpatient with GI for endoscopy.

## 2017-12-13 NOTE — PROGRESS NOTE ADULT - PROBLEM SELECTOR PLAN 1
Patient with RLL infiltrate with initial fever and increasing white count. Likelihood that patient has aspiration pneumonia over HAP.  -Discontinued Vancomycin yesterday following Coag Negative Staph results  -Discontinued Zosyn 2.25g q6h  -Started Unasyn 1.5g q6h on day 3 of total abx coverage  -BCx Coag Negative Staph. Most likely contaminate, surveillance culture sent this morning Patient with RLL infiltrate with initial fever and increasing white count. Likelihood that patient has aspiration pneumonia over HAP.  -Discontinued Vancomycin 12/11 following Coag Negative Staph results  -Discontinued Zosyn 2.25g q6h  -Started Unasyn 1.5g q6h on day 3 of total abx coverage. day 5/7 of total abx  -BCx Coag Negative Staph. Most likely contaminate, surveillance culture sent this morning Patient with RLL infiltrate with initial fever and increasing white count. Likelihood that patient has aspiration pneumonia over HAP.  -Discontinued Vancomycin 12/11 following Coag Negative Staph results  -Discontinued Zosyn 2.25g q6h  -Started Unasyn 1.5g q6h on day 3 of total abx coverage. day 5/7 of total abx  -BCx Coag Negative Staph. Most likely contaminate, surveillance culture sent yesterday

## 2017-12-13 NOTE — PROGRESS NOTE ADULT - PROBLEM SELECTOR PLAN 4
Currently requires around the clock nursing care for all ADLs. Daughter agreed to have him returned to [facility] since she is unable to care for him and has no room to do so in her apartment.

## 2017-12-13 NOTE — PROGRESS NOTE ADULT - PROBLEM SELECTOR PLAN 5
[facility] hospice liaison informed about the patient and could provide services once patient is discharged back to [facility].

## 2017-12-13 NOTE — PROGRESS NOTE ADULT - PROBLEM SELECTOR PLAN 8
Patient has hypertension with increasing blood pressures.   -Begin IV Enalapril 1.25mg q6h and can titrate as needed  -Switch 100mg Metoprolol BID to 5mg Metoprolol IVP q6h can titrate as needed  -Can return to home regimen following speech and swallow Patient has hypertension with increasing blood pressures.   -Begin IV Enalapril 2.5mg q6h and can titrate as needed  -Switch 100mg Metoprolol BID to 5mg Metoprolol IVP q6h can titrate as needed  -Can return to home regimen following speech and swallow

## 2017-12-13 NOTE — PROGRESS NOTE ADULT - PROBLEM SELECTOR PLAN 2
Daughter met with team and agreed to pursue comfort feedings, but patient remains full code. As per my conversation with the daughter yesterday she was not ready to change his code status.

## 2017-12-13 NOTE — CONSULT NOTE ADULT - SUBJECTIVE AND OBJECTIVE BOX
83M w chronic SPT, pulled out 12/13.  PA replaced 20F SPT 12/13 in sterile manner, no issues. Replace in 4 weeks by his urologist 83M w chronic SPT, pulled out by patient 12/13.  PA replaced 20F SPT 12/13 in sterile manner, no issues. Replace in 4 weeks by his urologist

## 2017-12-13 NOTE — PROGRESS NOTE ADULT - PROBLEM SELECTOR PLAN 4
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

## 2017-12-13 NOTE — PROGRESS NOTE ADULT - PROBLEM SELECTOR PLAN 10
F: NS 75cc/h: will add dextrose if patient fails bedside dysphagia screen  E: Replete PRN   N: NPO due to history of dysphagia and possible GI bleed  P: 5000U HSQ q8h  C: FULL CODE  D: Continue on RMF, dispo pending stabilization of hemoglobin and determination of source of possible infection F: NS 75cc/h: will add dextrose if patient fails bedside dysphagia screen  E: Replete PRN   N: Dysphagia diet   P: 5000U HSQ q8h  C: FULL CODE  D: Continue on RMF, dispo pending stabilization of hemoglobin and determination of source of possible infection

## 2017-12-13 NOTE — PROGRESS NOTE ADULT - SUBJECTIVE AND OBJECTIVE BOX
OVERNIGHT EVENTS: KELVIN     SUBJECTIVE / INTERVAL HPI: Patient seen and examined at bedside. AOx0. Makes eye contact but does not follow commands     VITAL SIGNS:  Vital Signs Last 24 Hrs  T(C): 36.6 (13 Dec 2017 04:44), Max: 36.9 (12 Dec 2017 16:56)  T(F): 97.8 (13 Dec 2017 04:44), Max: 98.4 (12 Dec 2017 16:56)  HR: 81 (13 Dec 2017 04:44) (71 - 88)  BP: 131/84 (13 Dec 2017 04:44) (131/84 - 168/86)  BP(mean): --  RR: 18 (13 Dec 2017 04:44) (16 - 18)  SpO2: 95% (13 Dec 2017 04:44) (93% - 95%)    PHYSICAL EXAM:    General: AOx0  HEENT: NC/AT; PERRL, anicteric sclera; MMM  Neck: supple  Cardiovascular: +S1/S2; RRR  Respiratory: Bibasilar crackles   Gastrointestinal: soft, NT/ND; +BSx4  Extremities: WWP; no edema, clubbing or cyanosis  Vascular: 2+ radial, DP/PT pulses B/L  Neurological: AAOx3; no focal deficits    MEDICATIONS:  MEDICATIONS  (STANDING):  amLODIPine   Tablet 5 milliGRAM(s) Oral daily  ampicillin/sulbactam  IVPB 3 Gram(s) IV Intermittent every 8 hours  aspirin enteric coated 81 milliGRAM(s) Oral daily  atorvastatin 10 milliGRAM(s) Oral at bedtime  dextrose 5%. 1000 milliLiter(s) (50 mL/Hr) IV Continuous <Continuous>  dextrose 50% Injectable 12.5 Gram(s) IV Push once  dextrose 50% Injectable 25 Gram(s) IV Push once  dextrose 50% Injectable 25 Gram(s) IV Push once  enalaprilat Injectable 2.5 milliGRAM(s) IV Push every 6 hours  heparin  Injectable 5000 Unit(s) SubCutaneous every 8 hours  insulin lispro (HumaLOG) corrective regimen sliding scale   SubCutaneous Before meals and at bedtime  levETIRAcetam  IVPB 500 milliGRAM(s) IV Intermittent every 12 hours  levothyroxine Injectable 25 MICROGram(s) IV Push daily  metoprolol    tartrate Injectable 5 milliGRAM(s) IV Push every 6 hours  pantoprazole  Injectable 40 milliGRAM(s) IV Push two times a day  sodium chloride 0.9%. 1000 milliLiter(s) (75 mL/Hr) IV Continuous <Continuous>    MEDICATIONS  (PRN):  acetaminophen   Tablet 650 milliGRAM(s) Oral every 6 hours PRN For Temp greater than 38 C (100.4 F)  dextrose Gel 1 Dose(s) Oral once PRN Blood Glucose LESS THAN 70 milliGRAM(s)/deciliter  glucagon  Injectable 1 milliGRAM(s) IntraMuscular once PRN Glucose LESS THAN 70 milligrams/deciliter      ALLERGIES:  Allergies    No Known Allergies    Intolerances        LABS:                        10.4   8.6   )-----------( 343      ( 12 Dec 2017 11:34 )             30.9     12-12    142  |  105  |  10  ----------------------------<  156<H>  3.2<L>   |  16<L>  |  1.21    Ca    8.7      12 Dec 2017 11:34  Mg     1.7     12-12          CAPILLARY BLOOD GLUCOSE      POCT Blood Glucose.: 171 mg/dL (13 Dec 2017 07:54)      RADIOLOGY & ADDITIONAL TESTS: Reviewed.

## 2017-12-14 DIAGNOSIS — E87.2 ACIDOSIS: ICD-10-CM

## 2017-12-14 LAB
CULTURE RESULTS: SIGNIFICANT CHANGE UP
GLUCOSE BLDC GLUCOMTR-MCNC: 178 MG/DL — HIGH (ref 70–99)
GLUCOSE BLDC GLUCOMTR-MCNC: 189 MG/DL — HIGH (ref 70–99)
GLUCOSE BLDC GLUCOMTR-MCNC: 191 MG/DL — HIGH (ref 70–99)
GLUCOSE BLDC GLUCOMTR-MCNC: 238 MG/DL — HIGH (ref 70–99)
SPECIMEN SOURCE: SIGNIFICANT CHANGE UP

## 2017-12-14 PROCEDURE — 99233 SBSQ HOSP IP/OBS HIGH 50: CPT

## 2017-12-14 PROCEDURE — 99233 SBSQ HOSP IP/OBS HIGH 50: CPT | Mod: GC

## 2017-12-14 RX ORDER — HYDRALAZINE HCL 50 MG
10 TABLET ORAL
Qty: 0 | Refills: 0 | Status: DISCONTINUED | OUTPATIENT
Start: 2017-12-14 | End: 2017-12-15

## 2017-12-14 RX ORDER — METOPROLOL TARTRATE 50 MG
100 TABLET ORAL
Qty: 0 | Refills: 0 | Status: DISCONTINUED | OUTPATIENT
Start: 2017-12-14 | End: 2017-12-15

## 2017-12-14 RX ORDER — PANTOPRAZOLE SODIUM 20 MG/1
40 TABLET, DELAYED RELEASE ORAL
Qty: 0 | Refills: 0 | Status: DISCONTINUED | OUTPATIENT
Start: 2017-12-14 | End: 2017-12-15

## 2017-12-14 RX ORDER — SODIUM CHLORIDE 9 MG/ML
1000 INJECTION, SOLUTION INTRAVENOUS
Qty: 0 | Refills: 0 | Status: DISCONTINUED | OUTPATIENT
Start: 2017-12-14 | End: 2017-12-15

## 2017-12-14 RX ORDER — LEVOTHYROXINE SODIUM 125 MCG
50 TABLET ORAL DAILY
Qty: 0 | Refills: 0 | Status: DISCONTINUED | OUTPATIENT
Start: 2017-12-14 | End: 2017-12-15

## 2017-12-14 RX ORDER — LEVETIRACETAM 250 MG/1
500 TABLET, FILM COATED ORAL
Qty: 0 | Refills: 0 | Status: DISCONTINUED | OUTPATIENT
Start: 2017-12-14 | End: 2017-12-15

## 2017-12-14 RX ORDER — PANTOPRAZOLE SODIUM 20 MG/1
40 TABLET, DELAYED RELEASE ORAL DAILY
Qty: 0 | Refills: 0 | Status: DISCONTINUED | OUTPATIENT
Start: 2017-12-14 | End: 2017-12-14

## 2017-12-14 RX ADMIN — Medication 2.5 MILLIGRAM(S): at 17:29

## 2017-12-14 RX ADMIN — Medication 2.5 MILLIGRAM(S): at 00:28

## 2017-12-14 RX ADMIN — PANTOPRAZOLE SODIUM 40 MILLIGRAM(S): 20 TABLET, DELAYED RELEASE ORAL at 05:51

## 2017-12-14 RX ADMIN — Medication 100 MILLIGRAM(S): at 22:49

## 2017-12-14 RX ADMIN — Medication 25 MICROGRAM(S): at 05:51

## 2017-12-14 RX ADMIN — ATORVASTATIN CALCIUM 10 MILLIGRAM(S): 80 TABLET, FILM COATED ORAL at 22:49

## 2017-12-14 RX ADMIN — Medication 2.5 MILLIGRAM(S): at 10:45

## 2017-12-14 RX ADMIN — Medication 2: at 18:00

## 2017-12-14 RX ADMIN — Medication 5 MILLIGRAM(S): at 10:45

## 2017-12-14 RX ADMIN — Medication 1: at 09:50

## 2017-12-14 RX ADMIN — Medication 10 MILLIGRAM(S): at 22:49

## 2017-12-14 RX ADMIN — Medication 100 MILLIEQUIVALENT(S): at 00:21

## 2017-12-14 RX ADMIN — Medication 1: at 12:54

## 2017-12-14 RX ADMIN — SODIUM CHLORIDE 70 MILLILITER(S): 9 INJECTION, SOLUTION INTRAVENOUS at 10:51

## 2017-12-14 RX ADMIN — Medication 5 MILLIGRAM(S): at 17:30

## 2017-12-14 RX ADMIN — HEPARIN SODIUM 5000 UNIT(S): 5000 INJECTION INTRAVENOUS; SUBCUTANEOUS at 05:51

## 2017-12-14 RX ADMIN — Medication 5 MILLIGRAM(S): at 00:28

## 2017-12-14 RX ADMIN — Medication 1 PATCH: at 12:53

## 2017-12-14 RX ADMIN — SERTRALINE 50 MILLIGRAM(S): 25 TABLET, FILM COATED ORAL at 11:58

## 2017-12-14 RX ADMIN — LEVETIRACETAM 500 MILLIGRAM(S): 250 TABLET, FILM COATED ORAL at 17:59

## 2017-12-14 RX ADMIN — HEPARIN SODIUM 5000 UNIT(S): 5000 INJECTION INTRAVENOUS; SUBCUTANEOUS at 22:49

## 2017-12-14 RX ADMIN — HEPARIN SODIUM 5000 UNIT(S): 5000 INJECTION INTRAVENOUS; SUBCUTANEOUS at 14:55

## 2017-12-14 RX ADMIN — LEVETIRACETAM 420 MILLIGRAM(S): 250 TABLET, FILM COATED ORAL at 05:51

## 2017-12-14 RX ADMIN — Medication 1: at 22:49

## 2017-12-14 RX ADMIN — PANTOPRAZOLE SODIUM 40 MILLIGRAM(S): 20 TABLET, DELAYED RELEASE ORAL at 11:57

## 2017-12-14 RX ADMIN — SODIUM CHLORIDE 70 MILLILITER(S): 9 INJECTION, SOLUTION INTRAVENOUS at 22:48

## 2017-12-14 RX ADMIN — Medication 81 MILLIGRAM(S): at 11:58

## 2017-12-14 NOTE — PROGRESS NOTE ADULT - SUBJECTIVE AND OBJECTIVE BOX
OVERNIGHT EVENTS:    SUBJECTIVE / INTERVAL HPI: Patient seen and examined at bedside.     VITAL SIGNS:  Vital Signs Last 24 Hrs  T(C): 36.6 (14 Dec 2017 09:00), Max: 37.1 (14 Dec 2017 05:19)  T(F): 97.8 (14 Dec 2017 09:00), Max: 98.7 (14 Dec 2017 05:19)  HR: 93 (14 Dec 2017 09:00) (87 - 98)  BP: 166/93 (14 Dec 2017 09:00) (137/80 - 166/99)  BP(mean): --  RR: 18 (14 Dec 2017 09:00) (18 - 18)  SpO2: 96% (14 Dec 2017 09:00) (94% - 97%)    PHYSICAL EXAM:    General: WDWN  HEENT: NC/AT; PERRL, anicteric sclera; MMM  Neck: supple  Cardiovascular: +S1/S2; RRR  Respiratory: CTA B/L; no W/R/R  Gastrointestinal: soft, NT/ND; +BSx4  Extremities: WWP; no edema, clubbing or cyanosis  Vascular: 2+ radial, DP/PT pulses B/L  Neurological: AAOx3; no focal deficits    MEDICATIONS:  MEDICATIONS  (STANDING):  amLODIPine   Tablet 5 milliGRAM(s) Oral daily  aspirin enteric coated 81 milliGRAM(s) Oral daily  atorvastatin 10 milliGRAM(s) Oral at bedtime  cloNIDine Patch 0.1 mG/24Hr(s) 1 patch Topical every 7 days  dextrose 5%. 1000 milliLiter(s) (70 mL/Hr) IV Continuous <Continuous>  dextrose 5%. 1000 milliLiter(s) (50 mL/Hr) IV Continuous <Continuous>  dextrose 50% Injectable 12.5 Gram(s) IV Push once  dextrose 50% Injectable 25 Gram(s) IV Push once  dextrose 50% Injectable 25 Gram(s) IV Push once  enalaprilat Injectable 2.5 milliGRAM(s) IV Push every 6 hours  heparin  Injectable 5000 Unit(s) SubCutaneous every 8 hours  insulin lispro (HumaLOG) corrective regimen sliding scale   SubCutaneous Before meals and at bedtime  levETIRAcetam  IVPB 500 milliGRAM(s) IV Intermittent every 12 hours  levothyroxine Injectable 25 MICROGram(s) IV Push daily  metoprolol    tartrate Injectable 5 milliGRAM(s) IV Push every 6 hours  pantoprazole  Injectable 40 milliGRAM(s) IV Push daily  sertraline 50 milliGRAM(s) Oral daily  sodium chloride 0.9%. 1000 milliLiter(s) (75 mL/Hr) IV Continuous <Continuous>    MEDICATIONS  (PRN):  acetaminophen   Tablet 650 milliGRAM(s) Oral every 6 hours PRN For Temp greater than 38 C (100.4 F)  dextrose Gel 1 Dose(s) Oral once PRN Blood Glucose LESS THAN 70 milliGRAM(s)/deciliter  glucagon  Injectable 1 milliGRAM(s) IntraMuscular once PRN Glucose LESS THAN 70 milligrams/deciliter      ALLERGIES:  Allergies    No Known Allergies    Intolerances        LABS:                        10.4   8.6   )-----------( 343      ( 12 Dec 2017 11:34 )             30.9     12-13    146<H>  |  109<H>  |  11  ----------------------------<  194<H>  3.2<L>   |  18<L>  |  1.26    Ca    9.2      13 Dec 2017 08:56  Mg     1.7     12-12          CAPILLARY BLOOD GLUCOSE      POCT Blood Glucose.: 178 mg/dL (14 Dec 2017 08:13)      RADIOLOGY & ADDITIONAL TESTS: Reviewed.    ASSESSMENT:    PLAN: OVERNIGHT EVENTS: KEVLIN     SUBJECTIVE / INTERVAL HPI: Patient seen and examined at bedside.     VITAL SIGNS:  Vital Signs Last 24 Hrs  T(C): 36.6 (14 Dec 2017 09:00), Max: 37.1 (14 Dec 2017 05:19)  T(F): 97.8 (14 Dec 2017 09:00), Max: 98.7 (14 Dec 2017 05:19)  HR: 93 (14 Dec 2017 09:00) (87 - 98)  BP: 166/93 (14 Dec 2017 09:00) (137/80 - 166/99)  BP(mean): --  RR: 18 (14 Dec 2017 09:00) (18 - 18)  SpO2: 96% (14 Dec 2017 09:00) (94% - 97%)    PHYSICAL EXAM:    General: WDWN  HEENT: NC/AT; PERRL, anicteric sclera; MMM  Neck: supple  Cardiovascular: +S1/S2; RRR  Respiratory: CTA B/L; no W/R/R  Gastrointestinal: soft, NT/ND; +BSx4  Extremities: WWP; no edema, clubbing or cyanosis  Vascular: 2+ radial, DP/PT pulses B/L  Neurological: AAOx3; no focal deficits    MEDICATIONS:  MEDICATIONS  (STANDING):  amLODIPine   Tablet 5 milliGRAM(s) Oral daily  aspirin enteric coated 81 milliGRAM(s) Oral daily  atorvastatin 10 milliGRAM(s) Oral at bedtime  cloNIDine Patch 0.1 mG/24Hr(s) 1 patch Topical every 7 days  dextrose 5%. 1000 milliLiter(s) (70 mL/Hr) IV Continuous <Continuous>  dextrose 5%. 1000 milliLiter(s) (50 mL/Hr) IV Continuous <Continuous>  dextrose 50% Injectable 12.5 Gram(s) IV Push once  dextrose 50% Injectable 25 Gram(s) IV Push once  dextrose 50% Injectable 25 Gram(s) IV Push once  enalaprilat Injectable 2.5 milliGRAM(s) IV Push every 6 hours  heparin  Injectable 5000 Unit(s) SubCutaneous every 8 hours  insulin lispro (HumaLOG) corrective regimen sliding scale   SubCutaneous Before meals and at bedtime  levETIRAcetam  IVPB 500 milliGRAM(s) IV Intermittent every 12 hours  levothyroxine Injectable 25 MICROGram(s) IV Push daily  metoprolol    tartrate Injectable 5 milliGRAM(s) IV Push every 6 hours  pantoprazole  Injectable 40 milliGRAM(s) IV Push daily  sertraline 50 milliGRAM(s) Oral daily  sodium chloride 0.9%. 1000 milliLiter(s) (75 mL/Hr) IV Continuous <Continuous>    MEDICATIONS  (PRN):  acetaminophen   Tablet 650 milliGRAM(s) Oral every 6 hours PRN For Temp greater than 38 C (100.4 F)  dextrose Gel 1 Dose(s) Oral once PRN Blood Glucose LESS THAN 70 milliGRAM(s)/deciliter  glucagon  Injectable 1 milliGRAM(s) IntraMuscular once PRN Glucose LESS THAN 70 milligrams/deciliter      ALLERGIES:  Allergies    No Known Allergies    Intolerances        LABS:                        10.4   8.6   )-----------( 343      ( 12 Dec 2017 11:34 )             30.9     12-13    146<H>  |  109<H>  |  11  ----------------------------<  194<H>  3.2<L>   |  18<L>  |  1.26    Ca    9.2      13 Dec 2017 08:56  Mg     1.7     12-12          CAPILLARY BLOOD GLUCOSE      POCT Blood Glucose.: 178 mg/dL (14 Dec 2017 08:13)      RADIOLOGY & ADDITIONAL TESTS: Reviewed.    ASSESSMENT:    PLAN: OVERNIGHT EVENTS: KELVIN     SUBJECTIVE / INTERVAL HPI: Patient seen and examined at bedside. Non verbal AOx0.     VITAL SIGNS:  Vital Signs Last 24 Hrs  T(C): 36.6 (14 Dec 2017 09:00), Max: 37.1 (14 Dec 2017 05:19)  T(F): 97.8 (14 Dec 2017 09:00), Max: 98.7 (14 Dec 2017 05:19)  HR: 93 (14 Dec 2017 09:00) (87 - 98)  BP: 166/93 (14 Dec 2017 09:00) (137/80 - 166/99)  BP(mean): --  RR: 18 (14 Dec 2017 09:00) (18 - 18)  SpO2: 96% (14 Dec 2017 09:00) (94% - 97%)    PHYSICAL EXAM:    General: AOx0  HEENT: NC/AT; PERRL, anicteric sclera; MMM  Neck: supple  Cardiovascular: +S1/S2; RRR  Respiratory: LL wheezing   Gastrointestinal: soft, NT/ND; +BSx4  Extremities: WWP; no edema, clubbing or cyanosis  Vascular: 2+ radial, DP/PT pulses B/L  Neurological: AAOx3; no focal deficits    MEDICATIONS:  MEDICATIONS  (STANDING):  amLODIPine   Tablet 5 milliGRAM(s) Oral daily  aspirin enteric coated 81 milliGRAM(s) Oral daily  atorvastatin 10 milliGRAM(s) Oral at bedtime  cloNIDine Patch 0.1 mG/24Hr(s) 1 patch Topical every 7 days  dextrose 5%. 1000 milliLiter(s) (70 mL/Hr) IV Continuous <Continuous>  dextrose 5%. 1000 milliLiter(s) (50 mL/Hr) IV Continuous <Continuous>  dextrose 50% Injectable 12.5 Gram(s) IV Push once  dextrose 50% Injectable 25 Gram(s) IV Push once  dextrose 50% Injectable 25 Gram(s) IV Push once  enalaprilat Injectable 2.5 milliGRAM(s) IV Push every 6 hours  heparin  Injectable 5000 Unit(s) SubCutaneous every 8 hours  insulin lispro (HumaLOG) corrective regimen sliding scale   SubCutaneous Before meals and at bedtime  levETIRAcetam  IVPB 500 milliGRAM(s) IV Intermittent every 12 hours  levothyroxine Injectable 25 MICROGram(s) IV Push daily  metoprolol    tartrate Injectable 5 milliGRAM(s) IV Push every 6 hours  pantoprazole  Injectable 40 milliGRAM(s) IV Push daily  sertraline 50 milliGRAM(s) Oral daily  sodium chloride 0.9%. 1000 milliLiter(s) (75 mL/Hr) IV Continuous <Continuous>    MEDICATIONS  (PRN):  acetaminophen   Tablet 650 milliGRAM(s) Oral every 6 hours PRN For Temp greater than 38 C (100.4 F)  dextrose Gel 1 Dose(s) Oral once PRN Blood Glucose LESS THAN 70 milliGRAM(s)/deciliter  glucagon  Injectable 1 milliGRAM(s) IntraMuscular once PRN Glucose LESS THAN 70 milligrams/deciliter      ALLERGIES:  Allergies    No Known Allergies    Intolerances        LABS:                        10.4   8.6   )-----------( 343      ( 12 Dec 2017 11:34 )             30.9     12-13    146<H>  |  109<H>  |  11  ----------------------------<  194<H>  3.2<L>   |  18<L>  |  1.26    Ca    9.2      13 Dec 2017 08:56  Mg     1.7     12-12          CAPILLARY BLOOD GLUCOSE      POCT Blood Glucose.: 178 mg/dL (14 Dec 2017 08:13)      RADIOLOGY & ADDITIONAL TESTS: Reviewed.

## 2017-12-14 NOTE — PROGRESS NOTE ADULT - PROBLEM SELECTOR PLAN 2
Daughter met with team but patient remains full code. Left message with daughter to call back to discuss advance directives in the setting of comfort feedings. Plan to complete MOLST prior to discharge if firm advance directives or GOC decisions made by HCP.

## 2017-12-14 NOTE — PROGRESS NOTE ADULT - PROBLEM SELECTOR PLAN 2
Patient Hgb dropped from 10.8 to 9.6.  No episodes of hematemesis or bloody BMs as reported by nursing.  -Continue to trend daily CBC: can increase frequency if patient becomes tachy or suspect active bleed  -maintain active type and screen  -will transfuse if Hgb <7; currently 9.8  -Suspected source of loss Upper GI bleed associated with hematemesis. Unclear possible etiology (esophageal vs. peptic vs. duodenal). Patient hemoglobin currently stable and can follow up as outpatient with GI for endoscopy. Anion gap acidosis per recent BMP. Likely 2/2 starvation ketoacidosis.  - Poor PO intake.   -Starting on D5W @70cc/hour   - Will obtain BMP tomorrow to trend

## 2017-12-14 NOTE — PROGRESS NOTE ADULT - ATTENDING COMMENTS
Functional Quadriplegia: Frequent turning    Total Self Care: Will return to long term facility Functional Quadriplegia: Frequent turning    Total Self Care: Will return to long term facility    Hypernatremia: Change to D5W

## 2017-12-14 NOTE — PROGRESS NOTE ADULT - PROBLEM SELECTOR PLAN 6
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

## 2017-12-14 NOTE — PROGRESS NOTE ADULT - PROBLEM SELECTOR PLAN 3
Patient with a history of dysphagia. Unable to pass Speech and Pathology screening due to patient not being able to tolerate water and follow directions of test. Spoke with daughter about options and treatments. Daughter wants to continue feeds despite risks.  - Tried switching to PO meds today however pt does not follow commands   -Palliative Consult for GOC conversation with family member Patient has hypertension with increasing blood pressures.   -Begin IV Enalapril 2.5mg q6h and can titrate as needed  -Switch 100mg Metoprolol BID to 5mg Metoprolol IVP q6h can titrate as needed  - Adding Clonidine 0.1mg patch since pt cannot tolerate PO  -Can return to home regimen following speech and swallow

## 2017-12-14 NOTE — PROGRESS NOTE ADULT - PROBLEM SELECTOR PLAN 10
F: NS 75cc/h: will add dextrose if patient fails bedside dysphagia screen  E: Replete PRN   N: Dysphagia diet   P: 5000U HSQ q8h  C: FULL CODE  D: Continue on RMF, dispo pending stabilization of hemoglobin and determination of source of possible infection F: D5W @70cc/h:   E: Replete PRN   N: Dysphagia diet   P: 5000U HSQ q8h  C: FULL CODE- Pt's daughter in contact with Palliative- wants to make patient DNR/DNI however has not signed forms yet.  D: Continue on RMF

## 2017-12-14 NOTE — PROGRESS NOTE ADULT - PROBLEM SELECTOR PLAN 1
Patient with RLL infiltrate with initial fever and increasing white count. Likelihood that patient has aspiration pneumonia over HAP.  -Discontinued Vancomycin 12/11 following Coag Negative Staph results  -Discontinued Zosyn 2.25g q6h  -Started Unasyn 1.5g q6h on day 3 of total abx coverage. day 5/7 of total abx  -BCx Coag Negative Staph. Most likely contaminate, surveillance culture sent yesterday Patient with RLL infiltrate with initial fever and increasing white count. Likelihood that patient has aspiration pneumonia over HAP.  -Completed course of abx with Vancomycin and Zosyn 2.25g q6h (d/c'd 12/11 following Coag Negative Staph results) and then switched to Unasyn 1.5g q6h on day 3 of total abx coverage. 5 days of abx  -BCx Coag Negative Staph. Most likely contaminate, surveillance culture sent yesterday. No growth

## 2017-12-14 NOTE — PROGRESS NOTE ADULT - PROBLEM SELECTOR PLAN 5
[facility] hospice could provide services once patient is discharged back to [facility].

## 2017-12-14 NOTE — PROGRESS NOTE ADULT - PROBLEM SELECTOR PLAN 5
Switch home medication to IV while patient is NPO pending speech and swallow screen.   -Continue 500mg Keppra IV q12h Patient Hgb dropped from 10.8 to 9.6.  No episodes of hematemesis or bloody BMs as reported by nursing.  - Hb stablized, Hemodynamically stable. No longer trending   -Suspected source of loss Upper GI bleed associated with hematemesis. Unclear possible etiology (esophageal vs. peptic vs. duodenal). Patient hemoglobin currently stable and can follow up as outpatient with GI for endoscopy.

## 2017-12-14 NOTE — PROGRESS NOTE ADULT - PROBLEM SELECTOR PLAN 8
Patient has hypertension with increasing blood pressures.   -Begin IV Enalapril 2.5mg q6h and can titrate as needed  -Switch 100mg Metoprolol BID to 5mg Metoprolol IVP q6h can titrate as needed  -Can return to home regimen following speech and swallow Will initiate home Synthroid

## 2017-12-14 NOTE — PROGRESS NOTE ADULT - PROBLEM SELECTOR PLAN 1
Was on ampicillin/sulbactam. Minimally symptomatic. Tolerating room air.   Management as per primary team.

## 2017-12-14 NOTE — PROGRESS NOTE ADULT - PROBLEM SELECTOR PLAN 4
Currently requires around the clock nursing care for all ADLs. Daughter agreed to have him returned to [facility].

## 2017-12-14 NOTE — PROGRESS NOTE ADULT - PROBLEM SELECTOR PLAN 4
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 Unable to pass Speech and Pathology screening due to patient not being able to tolerate water and follow directions of test. Spoke with daughter about options and treatments. Daughter wants to continue feeds despite risks.  - Tried switching to PO meds today however pt does not follow commands   -Palliative Consult for GOC conversation with family member

## 2017-12-14 NOTE — PROGRESS NOTE ADULT - PROBLEM SELECTOR PLAN 7
Will initiate home Synthroid Switch home medication to IV while patient is NPO pending speech and swallow screen.   -Continue 500mg Keppra IV q12h

## 2017-12-14 NOTE — PROGRESS NOTE ADULT - SUBJECTIVE AND OBJECTIVE BOX
SHARRON TOBIN             MRN-[id number]    CC: [facility], Support    HPI:  Patient is a 83 year old male with past medical history of obstructive hydrocephalus, dementia (AAOx0), dysphagia, CKD, urinary retention w/ suprapubic catheter, DM, hypothyroidism, HTN, HLD, seizure disorder who was brought in from NH after having what is reported to be one episode of medium sized coffee ground vomitus and fever. Pt was previously admitted to [facility] in 2016 for concerns of aspiration pneumonia and UTI but found to have contaminated urine cultures and treatment was discontinued. Of note, the patient is nonverbal at baseline.     In the ED Vitals were TMax 104, HR 89-98, -151/70-97, RR 18-24, SpO2 98% on 2L NC. Labs Notable for Anemia (Hemoglobin 10.8), No Leukocytosis but with Elevated PMNs 88.7%. Decreased Bicarbonate 20, Elevated Creatinine 1.58, Mildly Elevated AST, Urinalysis Consistent with UTI. CXR without Acute Infiltrate. No EKG Performed. Patient given Vancomycin 1g IV x 1, Zosyn 3.375g IV x 1., NS 500mL IV x 1. Blood Cultures, Urine Culture Obtained. (09 Dec 2017 14:40)    SUBJECTIVE: Saw and evaluated Mr Tobin at [facility]. Remains minimally active, but not in distress. Primary team conversations with daughter regarding advance directives ongoing. I placed a call to the patient's daughter to clarify any possible questions regarding MOLST form she was given to review, but was only able to leave a message for her to callback. The patient's events were noted and currently awaiting transfer back to [facility].    ROS:  DYSPNEA: No  N/V (Y/N): Unable to assess                              Secretions (Y/N): No                Agitation(Y/N): No  Pain (Y/N): PAINAD: 2 (mild)      -Provocation/Palliation:  Unable to assess          -Quality/Quantity: Unable to assess          -Radiating: Unable to assess          -Severity: Mild  -Timing/Frequency:  Unable to assess          -Impact on ADLs:  Unable to assess            OTHER REVIEW OF SYSTEMS: UNABLE TO OBTAIN  due to medical condition    PEx:  T(C): 36.6 (12-14-17 @ 09:00), Max: 37.1 (12-14-17 @ 05:19)  HR: 93 (12-14-17 @ 09:00) (90 - 98)  BP: 166/93 (12-14-17 @ 09:00) (163/81 - 166/99)  RR: 18 (12-14-17 @ 09:00) (18 - 18)  SpO2: 96% (12-14-17 @ 09:00) (94% - 97%)  Wt(kg): 60.9    General: Elderly well appearing man laying in bed in no distress.    HEENT:  Temporal wasting PERRL MOM   Neck: Secretions improved No masses  CVS: RR S1S2  Resp: Unlabored CTAB anteriorly    GI:  Soft NT ND BS+  : Abraham+  clear yellow urine   Musc: No cyanosis or edema. Contracted/knees pulled up.    Neuro: Not following commands   Psych: Calm    Skin: Intact. Dystrophic toenails  Lymph: Normal    ALLERGIES: No Known Allergies    OPIATE NAÏVE (Y/N): Yes    MEDICATIONS: REVIEWED  MEDICATIONS  (STANDING):  amLODIPine   Tablet 5 milliGRAM(s) Oral daily  aspirin enteric coated 81 milliGRAM(s) Oral daily  atorvastatin 10 milliGRAM(s) Oral at bedtime  cloNIDine Patch 0.1 mG/24Hr(s) 1 patch Topical every 7 days  dextrose 5%. 1000 milliLiter(s) (70 mL/Hr) IV Continuous <Continuous>  dextrose 5%. 1000 milliLiter(s) (50 mL/Hr) IV Continuous <Continuous>  dextrose 50% Injectable 12.5 Gram(s) IV Push once  dextrose 50% Injectable 25 Gram(s) IV Push once  dextrose 50% Injectable 25 Gram(s) IV Push once  enalaprilat Injectable 2.5 milliGRAM(s) IV Push every 6 hours  heparin  Injectable 5000 Unit(s) SubCutaneous every 8 hours  insulin lispro (HumaLOG) corrective regimen sliding scale   SubCutaneous Before meals and at bedtime  levETIRAcetam  IVPB 500 milliGRAM(s) IV Intermittent every 12 hours  levothyroxine Injectable 25 MICROGram(s) IV Push daily  metoprolol    tartrate Injectable 5 milliGRAM(s) IV Push every 6 hours  pantoprazole  Injectable 40 milliGRAM(s) IV Push daily  sertraline 50 milliGRAM(s) Oral daily    MEDICATIONS  (PRN):  acetaminophen   Tablet 650 milliGRAM(s) Oral every 6 hours PRN For Temp greater than 38 C (100.4 F)  dextrose Gel 1 Dose(s) Oral once PRN Blood Glucose LESS THAN 70 milliGRAM(s)/deciliter  glucagon  Injectable 1 milliGRAM(s) IntraMuscular once PRN Glucose LESS THAN 70 milligrams/deciliter    LABS: REVIEWED  12-13    146<H>  |  109<H>  |  11  ----------------------------<  194<H>  3.2<L>   |  18<L>  |  1.26    Ca    9.2      13 Dec 2017 08:56    IMAGING: REVIEWED    Advanced Directives:     Full Code     HCP    Decision maker: The patient does not demonstrate capacity for complex medical decision making given medical condition.  Legal surrogate: Daughters Romy Castellano [phone] and Nick Tobin [phone] are the named HCP agents. Copy of HCP found in [facility], copy placed in paper chart.     GOALS OF CARE DISCUSSION       Palliative care info/counseling provided	           No family at bedside today, left message with daughter for callback       See previous Palliative Medicine Note       Documentation of GOC: Full code, but primary team continued advance directive conversations and seemed to feel the patients daughter would agree to DNR/DNI. Pending verification.          AGENCIES DISCUSSED       [facility] w/ hospice services by [facility]    REFERRALS	        Palliative Med        Unit SW/Case Mgmt        - Yarsanism       Speech/Swallow - Following       Massage Therapy        Music Therapy       Hospice - [facility]

## 2017-12-15 VITALS
SYSTOLIC BLOOD PRESSURE: 153 MMHG | OXYGEN SATURATION: 95 % | RESPIRATION RATE: 18 BRPM | DIASTOLIC BLOOD PRESSURE: 92 MMHG | TEMPERATURE: 98 F | HEART RATE: 72 BPM

## 2017-12-15 LAB
ANION GAP SERPL CALC-SCNC: 16 MMOL/L — SIGNIFICANT CHANGE UP (ref 5–17)
BUN SERPL-MCNC: 10 MG/DL — SIGNIFICANT CHANGE UP (ref 7–23)
CALCIUM SERPL-MCNC: 8.7 MG/DL — SIGNIFICANT CHANGE UP (ref 8.4–10.5)
CHLORIDE SERPL-SCNC: 106 MMOL/L — SIGNIFICANT CHANGE UP (ref 96–108)
CO2 SERPL-SCNC: 21 MMOL/L — LOW (ref 22–31)
CREAT SERPL-MCNC: 1.41 MG/DL — HIGH (ref 0.5–1.3)
GLUCOSE BLDC GLUCOMTR-MCNC: 189 MG/DL — HIGH (ref 70–99)
GLUCOSE BLDC GLUCOMTR-MCNC: 283 MG/DL — HIGH (ref 70–99)
GLUCOSE BLDC GLUCOMTR-MCNC: 302 MG/DL — HIGH (ref 70–99)
GLUCOSE SERPL-MCNC: 295 MG/DL — HIGH (ref 70–99)
MAGNESIUM SERPL-MCNC: 1.6 MG/DL — SIGNIFICANT CHANGE UP (ref 1.6–2.6)
POTASSIUM SERPL-MCNC: 2.7 MMOL/L — CRITICAL LOW (ref 3.5–5.3)
POTASSIUM SERPL-SCNC: 2.7 MMOL/L — CRITICAL LOW (ref 3.5–5.3)
SODIUM SERPL-SCNC: 143 MMOL/L — SIGNIFICANT CHANGE UP (ref 135–145)

## 2017-12-15 PROCEDURE — 99239 HOSP IP/OBS DSCHRG MGMT >30: CPT

## 2017-12-15 RX ORDER — POTASSIUM CHLORIDE 20 MEQ
40 PACKET (EA) ORAL DAILY
Qty: 0 | Refills: 0 | Status: DISCONTINUED | OUTPATIENT
Start: 2017-12-16 | End: 2017-12-15

## 2017-12-15 RX ORDER — LEVOTHYROXINE SODIUM 125 MCG
1 TABLET ORAL
Qty: 0 | Refills: 0 | COMMUNITY
Start: 2017-12-15

## 2017-12-15 RX ORDER — MAGNESIUM SULFATE 500 MG/ML
2 VIAL (ML) INJECTION ONCE
Qty: 0 | Refills: 0 | Status: COMPLETED | OUTPATIENT
Start: 2017-12-15 | End: 2017-12-15

## 2017-12-15 RX ORDER — HYDRALAZINE HCL 50 MG
1 TABLET ORAL
Qty: 0 | Refills: 0 | COMMUNITY
Start: 2017-12-15

## 2017-12-15 RX ORDER — POTASSIUM CHLORIDE 20 MEQ
40 PACKET (EA) ORAL EVERY 4 HOURS
Qty: 0 | Refills: 0 | Status: DISCONTINUED | OUTPATIENT
Start: 2017-12-15 | End: 2017-12-15

## 2017-12-15 RX ADMIN — Medication 40 MILLIEQUIVALENT(S): at 11:10

## 2017-12-15 RX ADMIN — LEVETIRACETAM 500 MILLIGRAM(S): 250 TABLET, FILM COATED ORAL at 06:13

## 2017-12-15 RX ADMIN — Medication 1: at 12:53

## 2017-12-15 RX ADMIN — SERTRALINE 50 MILLIGRAM(S): 25 TABLET, FILM COATED ORAL at 11:10

## 2017-12-15 RX ADMIN — SODIUM CHLORIDE 70 MILLILITER(S): 9 INJECTION, SOLUTION INTRAVENOUS at 06:13

## 2017-12-15 RX ADMIN — HEPARIN SODIUM 5000 UNIT(S): 5000 INJECTION INTRAVENOUS; SUBCUTANEOUS at 06:13

## 2017-12-15 RX ADMIN — Medication 50 MICROGRAM(S): at 06:13

## 2017-12-15 RX ADMIN — Medication 50 GRAM(S): at 09:26

## 2017-12-15 RX ADMIN — Medication 100 MILLIGRAM(S): at 09:35

## 2017-12-15 RX ADMIN — Medication 10 MILLIGRAM(S): at 09:27

## 2017-12-15 RX ADMIN — PANTOPRAZOLE SODIUM 40 MILLIGRAM(S): 20 TABLET, DELAYED RELEASE ORAL at 06:13

## 2017-12-15 RX ADMIN — Medication 4: at 08:00

## 2017-12-15 RX ADMIN — Medication 40 MILLIEQUIVALENT(S): at 08:07

## 2017-12-15 RX ADMIN — AMLODIPINE BESYLATE 5 MILLIGRAM(S): 2.5 TABLET ORAL at 06:13

## 2017-12-15 RX ADMIN — Medication 81 MILLIGRAM(S): at 11:10

## 2017-12-15 NOTE — PROGRESS NOTE ADULT - PROBLEM SELECTOR PLAN 6
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

## 2017-12-15 NOTE — PROGRESS NOTE ADULT - PROBLEM SELECTOR PLAN 5
Patient Hgb dropped from 10.8 to 9.6.  No episodes of hematemesis or bloody BMs as reported by nursing.  - Hb stablized, Hemodynamically stable. No longer trending   -Suspected source of loss Upper GI bleed associated with hematemesis. Unclear possible etiology (esophageal vs. peptic vs. duodenal). Patient hemoglobin currently stable and can follow up as outpatient with GI for endoscopy.

## 2017-12-15 NOTE — PROGRESS NOTE ADULT - PROBLEM SELECTOR PLAN 9
-ISS   -will increase as needed.  -Pt takes Levemir 15 units in AM and 30units at bedtime and Humalog 5units premeal
F: NS 75cc/h: will add dextrose if patient fails bedside dysphagia screen  E: Replete PRN   N: NPO due to history of dysphagia and possible GI bleed  P: 5000U HSQ q8h  C: FULL CODE  D: Continue on RMF, dispo pending stabilization of hemoglobin and determination of source of possible infection
-ISS   -will increase as needed.  -Pt takes Levemir 15 units in AM and 30units at bedtime and Humalog 5units premeal

## 2017-12-15 NOTE — PROGRESS NOTE ADULT - PROBLEM SELECTOR PLAN 2
Anion gap acidosis per recent BMP. Likely 2/2 starvation ketoacidosis.  - Poor PO intake.   -Resolved after D5W

## 2017-12-15 NOTE — PROGRESS NOTE ADULT - PROBLEM SELECTOR PLAN 3
Patient has hypertension with increasing blood pressures.   -Switched to home PO meds of 100mg Metoprolol BID, Hydralazine 10BID and Amlodipine 5 daily.   - Started Clonidine 0.1mg patch yesterday.   - WIll increase hydralazine to TID if pt continues to be hypertensive

## 2017-12-15 NOTE — PROGRESS NOTE ADULT - PROVIDER SPECIALTY LIST ADULT
Internal Medicine
Palliative Care
Palliative Care
Internal Medicine
Internal Medicine

## 2017-12-15 NOTE — PROGRESS NOTE ADULT - PROBLEM SELECTOR PLAN 1
Patient with RLL infiltrate with initial fever and increasing white count. Likelihood that patient has aspiration pneumonia over HAP.  -Completed course of abx with Vancomycin and Zosyn 2.25g q6h (d/c'd 12/11 following Coag Negative Staph results) and then switched to Unasyn 1.5g q6h on day 3 of total abx coverage. 5 days of abx  -BCx Coag Negative Staph. Most likely contaminate, surveillance culture sent yesterday. No growth

## 2017-12-15 NOTE — PROGRESS NOTE ADULT - PROBLEM SELECTOR PLAN 4
Patient with a history of dysphagia. Unable to pass Speech and Pathology screening due to patient not being able to tolerate water and follow directions of test. Spoke with daughter about options and treatments. Daughter wants to continue feeds despite risks.  - Tried switching to PO meds today however pt does not follow commands   -Palliative Consult for GOC conversation with family member

## 2017-12-15 NOTE — PROGRESS NOTE ADULT - ASSESSMENT
83 year old man with history of cognitive declined currently living at NH presents with fever and concerns for dysphagia and aspiration pneumonia. Found to have fever and emesis. Treatment for UTI and HCAP. Consult for GOC.
83 year old man with history of cognitive declined currently living at NH presents with fever and concerns for dysphagia and aspiration pneumonia. Found to have fever and emesis. Treatment for UTI and HCAP. Consult for GOC.
83M non-verbal with obstructive hydrocephalus, dementia, dysphagia, CKD, urinary retention w/ suprapubic catheter, DM, Hypothyroid, HTN, HLD, seizure disorder presenting with episode of coffee ground emesis and admitted for possible UGIB, treatment of acute cystitis, and potential of aspiration PNA
83M non-verbal with obstructive hydrocephalus, dementia, dysphagia, CKD, urinary retention w/ suprapubic catheter, DM, Hypothyroid, HTN, HLD, seizure disorder presenting with episode of coffee ground emesis and admitted for UGIB and potential of aspiration PNA

## 2017-12-15 NOTE — PROGRESS NOTE ADULT - PROBLEM SELECTOR PLAN 10
F: No IVF  E: Replete PRN   N: Dysphagia diet   P: 5000U HSQ q8h  C: FULL CODE- Pt's daughter in contact with Palliative- wants to make patient DNR/DNI however has not signed forms yet.  D: Continue on RMF

## 2017-12-15 NOTE — PROGRESS NOTE ADULT - SUBJECTIVE AND OBJECTIVE BOX
OVERNIGHT EVENTS: KELVIN     SUBJECTIVE / INTERVAL HPI: Patient seen and examined at bedside. AOx) non verbal    VITAL SIGNS:  Vital Signs Last 24 Hrs  T(C): 36.6 (15 Dec 2017 08:57), Max: 37.3 (14 Dec 2017 21:04)  T(F): 97.8 (15 Dec 2017 08:57), Max: 99.2 (14 Dec 2017 21:04)  HR: 72 (15 Dec 2017 08:57) (72 - 89)  BP: 153/92 (15 Dec 2017 08:57) (148/89 - 172/89)  BP(mean): --  RR: 18 (15 Dec 2017 08:57) (17 - 20)  SpO2: 95% (15 Dec 2017 08:57) (93% - 96%)    PHYSICAL EXAM:    General: AOx0  HEENT: NC/AT; PERRL, anicteric sclera; MMM  Neck: supple  Cardiovascular: +S1/S2; RRR  Respiratory: CTA b/l  Gastrointestinal: soft, NT/ND; +BSx4  Extremities: WWP; no edema, clubbing or cyanosis  Vascular: 2+ radial, DP/PT pulses B/L  Neurological: AAOx3; no focal deficits    MEDICATIONS:  MEDICATIONS  (STANDING):  amLODIPine   Tablet 5 milliGRAM(s) Oral daily  aspirin enteric coated 81 milliGRAM(s) Oral daily  atorvastatin 10 milliGRAM(s) Oral at bedtime  cloNIDine Patch 0.1 mG/24Hr(s) 1 patch Topical every 7 days  dextrose 5%. 1000 milliLiter(s) (50 mL/Hr) IV Continuous <Continuous>  dextrose 50% Injectable 12.5 Gram(s) IV Push once  dextrose 50% Injectable 25 Gram(s) IV Push once  dextrose 50% Injectable 25 Gram(s) IV Push once  heparin  Injectable 5000 Unit(s) SubCutaneous every 8 hours  hydrALAZINE 10 milliGRAM(s) Oral two times a day  insulin lispro (HumaLOG) corrective regimen sliding scale   SubCutaneous Before meals and at bedtime  levETIRAcetam 500 milliGRAM(s) Oral two times a day  levothyroxine 50 MICROGram(s) Oral daily  magnesium sulfate  IVPB 2 Gram(s) IV Intermittent once  metoprolol     tartrate 100 milliGRAM(s) Oral two times a day  pantoprazole    Tablet 40 milliGRAM(s) Oral before breakfast  potassium chloride   Powder 40 milliEquivalent(s) Oral every 4 hours  sertraline 50 milliGRAM(s) Oral daily    MEDICATIONS  (PRN):  acetaminophen   Tablet 650 milliGRAM(s) Oral every 6 hours PRN For Temp greater than 38 C (100.4 F)  dextrose Gel 1 Dose(s) Oral once PRN Blood Glucose LESS THAN 70 milliGRAM(s)/deciliter  glucagon  Injectable 1 milliGRAM(s) IntraMuscular once PRN Glucose LESS THAN 70 milligrams/deciliter      ALLERGIES:  Allergies    No Known Allergies    Intolerances        LABS:    12-15    143  |  106  |  10  ----------------------------<  295<H>  2.7<LL>   |  21<L>  |  1.41<H>    Ca    8.7      15 Dec 2017 06:43  Mg     1.6     12-15          CAPILLARY BLOOD GLUCOSE      POCT Blood Glucose.: 302 mg/dL (15 Dec 2017 07:49)      RADIOLOGY & ADDITIONAL TESTS: Reviewed.

## 2017-12-15 NOTE — PROGRESS NOTE ADULT - PROBLEM SELECTOR PLAN 8
Will initiate home Synthroid    #Hypokalemia  -2/2 poor Oral intake   - Starting on 40po K Hamida daily   - Repleting lytes today

## 2017-12-15 NOTE — PROGRESS NOTE ADULT - PROBLEM SELECTOR PROBLEM 9
Type 2 diabetes mellitus
Type 2 diabetes mellitus
Nutrition, metabolism, and development symptoms
Type 2 diabetes mellitus

## 2017-12-15 NOTE — PROGRESS NOTE ADULT - PROBLEM SELECTOR PROBLEM 1
Anemia due to blood loss
Aspiration pneumonia of right lower lobe due to vomit
Aspiration pneumonia of right lower lobe due to vomit
Anemia due to blood loss
Aspiration pneumonia due to vomit, unspecified laterality, unspecified part of lung

## 2017-12-16 LAB
-  CEFAZOLIN: SIGNIFICANT CHANGE UP
-  CLINDAMYCIN: SIGNIFICANT CHANGE UP
-  ERYTHROMYCIN: SIGNIFICANT CHANGE UP
-  LINEZOLID: SIGNIFICANT CHANGE UP
-  OXACILLIN: SIGNIFICANT CHANGE UP
-  PENICILLIN: SIGNIFICANT CHANGE UP
-  RIFAMPIN: SIGNIFICANT CHANGE UP
-  TRIMETHOPRIM/SULFAMETHOXAZOLE: SIGNIFICANT CHANGE UP
-  VANCOMYCIN: SIGNIFICANT CHANGE UP
CULTURE RESULTS: SIGNIFICANT CHANGE UP
METHOD TYPE: SIGNIFICANT CHANGE UP
ORGANISM # SPEC MICROSCOPIC CNT: SIGNIFICANT CHANGE UP
SPECIMEN SOURCE: SIGNIFICANT CHANGE UP

## 2017-12-17 LAB
CULTURE RESULTS: SIGNIFICANT CHANGE UP
SPECIMEN SOURCE: SIGNIFICANT CHANGE UP

## 2017-12-18 DIAGNOSIS — E03.9 HYPOTHYROIDISM, UNSPECIFIED: ICD-10-CM

## 2017-12-18 DIAGNOSIS — R06.02 SHORTNESS OF BREATH: ICD-10-CM

## 2017-12-18 DIAGNOSIS — Z78.1 PHYSICAL RESTRAINT STATUS: ICD-10-CM

## 2017-12-18 DIAGNOSIS — N39.0 URINARY TRACT INFECTION, SITE NOT SPECIFIED: ICD-10-CM

## 2017-12-18 DIAGNOSIS — R13.10 DYSPHAGIA, UNSPECIFIED: ICD-10-CM

## 2017-12-18 DIAGNOSIS — G91.1 OBSTRUCTIVE HYDROCEPHALUS: ICD-10-CM

## 2017-12-18 DIAGNOSIS — F03.90 UNSPECIFIED DEMENTIA WITHOUT BEHAVIORAL DISTURBANCE: ICD-10-CM

## 2017-12-18 DIAGNOSIS — R33.9 RETENTION OF URINE, UNSPECIFIED: ICD-10-CM

## 2017-12-18 DIAGNOSIS — R53.2 FUNCTIONAL QUADRIPLEGIA: ICD-10-CM

## 2017-12-18 DIAGNOSIS — J69.0 PNEUMONITIS DUE TO INHALATION OF FOOD AND VOMIT: ICD-10-CM

## 2017-12-18 DIAGNOSIS — I12.9 HYPERTENSIVE CHRONIC KIDNEY DISEASE WITH STAGE 1 THROUGH STAGE 4 CHRONIC KIDNEY DISEASE, OR UNSPECIFIED CHRONIC KIDNEY DISEASE: ICD-10-CM

## 2017-12-18 DIAGNOSIS — E11.9 TYPE 2 DIABETES MELLITUS WITHOUT COMPLICATIONS: ICD-10-CM

## 2017-12-18 DIAGNOSIS — E55.9 VITAMIN D DEFICIENCY, UNSPECIFIED: ICD-10-CM

## 2017-12-18 DIAGNOSIS — G40.909 EPILEPSY, UNSPECIFIED, NOT INTRACTABLE, WITHOUT STATUS EPILEPTICUS: ICD-10-CM

## 2017-12-18 DIAGNOSIS — T83.511A INFECTION AND INFLAMMATORY REACTION DUE TO INDWELLING URETHRAL CATHETER, INITIAL ENCOUNTER: ICD-10-CM

## 2017-12-18 DIAGNOSIS — E78.5 HYPERLIPIDEMIA, UNSPECIFIED: ICD-10-CM

## 2017-12-18 DIAGNOSIS — N18.9 CHRONIC KIDNEY DISEASE, UNSPECIFIED: ICD-10-CM
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
## 2017-12-19 PROCEDURE — 81001 URINALYSIS AUTO W/SCOPE: CPT

## 2017-12-19 PROCEDURE — 87633 RESP VIRUS 12-25 TARGETS: CPT

## 2017-12-19 PROCEDURE — 87798 DETECT AGENT NOS DNA AMP: CPT

## 2017-12-19 PROCEDURE — 87581 M.PNEUMON DNA AMP PROBE: CPT

## 2017-12-19 PROCEDURE — 83690 ASSAY OF LIPASE: CPT

## 2017-12-19 PROCEDURE — 86900 BLOOD TYPING SEROLOGIC ABO: CPT

## 2017-12-19 PROCEDURE — 80074 ACUTE HEPATITIS PANEL: CPT

## 2017-12-19 PROCEDURE — 93005 ELECTROCARDIOGRAM TRACING: CPT

## 2017-12-19 PROCEDURE — 85610 PROTHROMBIN TIME: CPT

## 2017-12-19 PROCEDURE — 99285 EMERGENCY DEPT VISIT HI MDM: CPT | Mod: 25

## 2017-12-19 PROCEDURE — 96375 TX/PRO/DX INJ NEW DRUG ADDON: CPT

## 2017-12-19 PROCEDURE — 84100 ASSAY OF PHOSPHORUS: CPT

## 2017-12-19 PROCEDURE — 82607 VITAMIN B-12: CPT

## 2017-12-19 PROCEDURE — 96374 THER/PROPH/DIAG INJ IV PUSH: CPT

## 2017-12-19 PROCEDURE — 85027 COMPLETE CBC AUTOMATED: CPT

## 2017-12-19 PROCEDURE — 82746 ASSAY OF FOLIC ACID SERUM: CPT

## 2017-12-19 PROCEDURE — 82962 GLUCOSE BLOOD TEST: CPT

## 2017-12-19 PROCEDURE — 71045 X-RAY EXAM CHEST 1 VIEW: CPT

## 2017-12-19 PROCEDURE — 83605 ASSAY OF LACTIC ACID: CPT

## 2017-12-19 PROCEDURE — 83735 ASSAY OF MAGNESIUM: CPT

## 2017-12-19 PROCEDURE — 86901 BLOOD TYPING SEROLOGIC RH(D): CPT

## 2017-12-19 PROCEDURE — 82728 ASSAY OF FERRITIN: CPT

## 2017-12-19 PROCEDURE — 83550 IRON BINDING TEST: CPT

## 2017-12-19 PROCEDURE — 87186 SC STD MICRODIL/AGAR DIL: CPT

## 2017-12-19 PROCEDURE — 92610 EVALUATE SWALLOWING FUNCTION: CPT | Mod: GN

## 2017-12-19 PROCEDURE — 87040 BLOOD CULTURE FOR BACTERIA: CPT

## 2017-12-19 PROCEDURE — 87486 CHLMYD PNEUM DNA AMP PROBE: CPT

## 2017-12-19 PROCEDURE — 87086 URINE CULTURE/COLONY COUNT: CPT

## 2017-12-19 PROCEDURE — 82803 BLOOD GASES ANY COMBINATION: CPT

## 2017-12-19 PROCEDURE — 84443 ASSAY THYROID STIM HORMONE: CPT

## 2017-12-19 PROCEDURE — 80048 BASIC METABOLIC PNL TOTAL CA: CPT

## 2017-12-19 PROCEDURE — 36415 COLL VENOUS BLD VENIPUNCTURE: CPT

## 2017-12-19 PROCEDURE — 85730 THROMBOPLASTIN TIME PARTIAL: CPT

## 2017-12-19 PROCEDURE — 85025 COMPLETE CBC W/AUTO DIFF WBC: CPT

## 2017-12-19 PROCEDURE — 86850 RBC ANTIBODY SCREEN: CPT

## 2017-12-19 PROCEDURE — 87150 DNA/RNA AMPLIFIED PROBE: CPT

## 2017-12-19 PROCEDURE — 80053 COMPREHEN METABOLIC PANEL: CPT

## 2017-12-20 ENCOUNTER — TRANSCRIPTION ENCOUNTER (OUTPATIENT)
Age: 82
End: 2017-12-20

## 2017-12-20 ENCOUNTER — EMERGENCY (EMERGENCY)
Facility: HOSPITAL | Age: 82
LOS: 1 days | Discharge: HOME | End: 2017-12-20
Attending: EMERGENCY MEDICINE | Admitting: INTERNAL MEDICINE
Payer: MEDICARE

## 2017-12-20 VITALS
SYSTOLIC BLOOD PRESSURE: 102 MMHG | RESPIRATION RATE: 18 BRPM | OXYGEN SATURATION: 96 % | DIASTOLIC BLOOD PRESSURE: 68 MMHG | HEART RATE: 85 BPM

## 2017-12-20 VITALS
SYSTOLIC BLOOD PRESSURE: 107 MMHG | OXYGEN SATURATION: 84 % | RESPIRATION RATE: 28 BRPM | DIASTOLIC BLOOD PRESSURE: 67 MMHG | HEART RATE: 71 BPM

## 2017-12-20 DIAGNOSIS — R06.02 SHORTNESS OF BREATH: ICD-10-CM

## 2017-12-20 DIAGNOSIS — G40.909 EPILEPSY, UNSPECIFIED, NOT INTRACTABLE, WITHOUT STATUS EPILEPTICUS: ICD-10-CM

## 2017-12-20 DIAGNOSIS — R06.03 ACUTE RESPIRATORY DISTRESS: ICD-10-CM

## 2017-12-20 DIAGNOSIS — J96.90 RESPIRATORY FAILURE, UNSPECIFIED, UNSPECIFIED WHETHER WITH HYPOXIA OR HYPERCAPNIA: ICD-10-CM

## 2017-12-20 DIAGNOSIS — E11.9 TYPE 2 DIABETES MELLITUS WITHOUT COMPLICATIONS: ICD-10-CM

## 2017-12-20 DIAGNOSIS — Z71.0 PERSON ENCOUNTERING HEALTH SERVICES TO CONSULT ON BEHALF OF ANOTHER PERSON: ICD-10-CM

## 2017-12-20 DIAGNOSIS — Z51.5 ENCOUNTER FOR PALLIATIVE CARE: ICD-10-CM

## 2017-12-20 DIAGNOSIS — N18.9 CHRONIC KIDNEY DISEASE, UNSPECIFIED: ICD-10-CM

## 2017-12-20 DIAGNOSIS — E78.5 HYPERLIPIDEMIA, UNSPECIFIED: ICD-10-CM

## 2017-12-20 DIAGNOSIS — E03.9 HYPOTHYROIDISM, UNSPECIFIED: ICD-10-CM

## 2017-12-20 DIAGNOSIS — Z66 DO NOT RESUSCITATE: ICD-10-CM

## 2017-12-20 DIAGNOSIS — I12.9 HYPERTENSIVE CHRONIC KIDNEY DISEASE WITH STAGE 1 THROUGH STAGE 4 CHRONIC KIDNEY DISEASE, OR UNSPECIFIED CHRONIC KIDNEY DISEASE: ICD-10-CM

## 2017-12-20 DIAGNOSIS — A41.9 SEPSIS, UNSPECIFIED ORGANISM: ICD-10-CM

## 2017-12-20 DIAGNOSIS — Z71.89 OTHER SPECIFIED COUNSELING: ICD-10-CM

## 2017-12-20 DIAGNOSIS — I10 ESSENTIAL (PRIMARY) HYPERTENSION: ICD-10-CM

## 2017-12-20 DIAGNOSIS — Z98.89 OTHER SPECIFIED POSTPROCEDURAL STATES: Chronic | ICD-10-CM

## 2017-12-20 DIAGNOSIS — Z79.899 OTHER LONG TERM (CURRENT) DRUG THERAPY: ICD-10-CM

## 2017-12-20 DIAGNOSIS — R63.8 OTHER SYMPTOMS AND SIGNS CONCERNING FOOD AND FLUID INTAKE: ICD-10-CM

## 2017-12-20 DIAGNOSIS — Z79.2 LONG TERM (CURRENT) USE OF ANTIBIOTICS: ICD-10-CM

## 2017-12-20 DIAGNOSIS — J18.9 PNEUMONIA, UNSPECIFIED ORGANISM: ICD-10-CM

## 2017-12-20 DIAGNOSIS — J18.1 LOBAR PNEUMONIA, UNSPECIFIED ORGANISM: ICD-10-CM

## 2017-12-20 DIAGNOSIS — N17.9 ACUTE KIDNEY FAILURE, UNSPECIFIED: ICD-10-CM

## 2017-12-20 DIAGNOSIS — Z79.82 LONG TERM (CURRENT) USE OF ASPIRIN: ICD-10-CM

## 2017-12-20 LAB
ALBUMIN SERPL ELPH-MCNC: 2.6 G/DL — LOW (ref 3.3–5)
ALP SERPL-CCNC: 170 U/L — HIGH (ref 40–120)
ALT FLD-CCNC: 46 U/L — HIGH (ref 10–45)
ANION GAP SERPL CALC-SCNC: 21 MMOL/L — HIGH (ref 5–17)
APPEARANCE UR: (no result)
AST SERPL-CCNC: 63 U/L — HIGH (ref 10–40)
BASOPHILS NFR BLD AUTO: 0.2 % — SIGNIFICANT CHANGE UP (ref 0–2)
BILIRUB SERPL-MCNC: 0.5 MG/DL — SIGNIFICANT CHANGE UP (ref 0.2–1.2)
BILIRUB UR-MCNC: NEGATIVE — SIGNIFICANT CHANGE UP
BUN SERPL-MCNC: 58 MG/DL — HIGH (ref 7–23)
CALCIUM SERPL-MCNC: 10 MG/DL — SIGNIFICANT CHANGE UP (ref 8.4–10.5)
CHLORIDE SERPL-SCNC: 125 MMOL/L — HIGH (ref 96–108)
CO2 SERPL-SCNC: 19 MMOL/L — LOW (ref 22–31)
COLOR SPEC: YELLOW — SIGNIFICANT CHANGE UP
CREAT SERPL-MCNC: 3.77 MG/DL — HIGH (ref 0.5–1.3)
DIFF PNL FLD: (no result)
EOSINOPHIL NFR BLD AUTO: 0.1 % — SIGNIFICANT CHANGE UP (ref 0–6)
GLUCOSE SERPL-MCNC: 234 MG/DL — HIGH (ref 70–99)
GLUCOSE UR QL: NEGATIVE — SIGNIFICANT CHANGE UP
HCT VFR BLD CALC: 37.9 % — LOW (ref 39–50)
HGB BLD-MCNC: 12.3 G/DL — LOW (ref 13–17)
KETONES UR-MCNC: NEGATIVE — SIGNIFICANT CHANGE UP
LACTATE SERPL-SCNC: 6 MMOL/L — CRITICAL HIGH (ref 0.5–2)
LEUKOCYTE ESTERASE UR-ACNC: (no result)
LYMPHOCYTES # BLD AUTO: 11.6 % — LOW (ref 13–44)
MCHC RBC-ENTMCNC: 28.5 PG — SIGNIFICANT CHANGE UP (ref 27–34)
MCHC RBC-ENTMCNC: 32.5 G/DL — SIGNIFICANT CHANGE UP (ref 32–36)
MCV RBC AUTO: 87.7 FL — SIGNIFICANT CHANGE UP (ref 80–100)
MONOCYTES NFR BLD AUTO: 2.9 % — SIGNIFICANT CHANGE UP (ref 2–14)
NEUTROPHILS NFR BLD AUTO: 85.2 % — HIGH (ref 43–77)
NITRITE UR-MCNC: NEGATIVE — SIGNIFICANT CHANGE UP
PH UR: 5.5 — SIGNIFICANT CHANGE UP (ref 5–8)
PLATELET # BLD AUTO: 514 K/UL — HIGH (ref 150–400)
POTASSIUM SERPL-MCNC: 3.9 MMOL/L — SIGNIFICANT CHANGE UP (ref 3.5–5.3)
POTASSIUM SERPL-SCNC: 3.9 MMOL/L — SIGNIFICANT CHANGE UP (ref 3.5–5.3)
PROT SERPL-MCNC: 8.7 G/DL — HIGH (ref 6–8.3)
PROT UR-MCNC: 100 MG/DL
RBC # BLD: 4.32 M/UL — SIGNIFICANT CHANGE UP (ref 4.2–5.8)
RBC # FLD: 15.4 % — SIGNIFICANT CHANGE UP (ref 10.3–16.9)
SODIUM SERPL-SCNC: 165 MMOL/L — CRITICAL HIGH (ref 135–145)
SP GR SPEC: 1.01 — SIGNIFICANT CHANGE UP (ref 1–1.03)
UROBILINOGEN FLD QL: 0.2 E.U./DL — SIGNIFICANT CHANGE UP
WBC # BLD: 15 K/UL — HIGH (ref 3.8–10.5)
WBC # FLD AUTO: 15 K/UL — HIGH (ref 3.8–10.5)

## 2017-12-20 PROCEDURE — 87086 URINE CULTURE/COLONY COUNT: CPT

## 2017-12-20 PROCEDURE — 99497 ADVNCD CARE PLAN 30 MIN: CPT | Mod: 25

## 2017-12-20 PROCEDURE — 80053 COMPREHEN METABOLIC PANEL: CPT

## 2017-12-20 PROCEDURE — 83605 ASSAY OF LACTIC ACID: CPT

## 2017-12-20 PROCEDURE — 99285 EMERGENCY DEPT VISIT HI MDM: CPT | Mod: 25

## 2017-12-20 PROCEDURE — 96375 TX/PRO/DX INJ NEW DRUG ADDON: CPT

## 2017-12-20 PROCEDURE — 71010: CPT | Mod: 26

## 2017-12-20 PROCEDURE — 87040 BLOOD CULTURE FOR BACTERIA: CPT

## 2017-12-20 PROCEDURE — 36415 COLL VENOUS BLD VENIPUNCTURE: CPT

## 2017-12-20 PROCEDURE — 85025 COMPLETE CBC W/AUTO DIFF WBC: CPT

## 2017-12-20 PROCEDURE — 99223 1ST HOSP IP/OBS HIGH 75: CPT

## 2017-12-20 PROCEDURE — 81001 URINALYSIS AUTO W/SCOPE: CPT

## 2017-12-20 PROCEDURE — 96374 THER/PROPH/DIAG INJ IV PUSH: CPT

## 2017-12-20 PROCEDURE — 71045 X-RAY EXAM CHEST 1 VIEW: CPT

## 2017-12-20 PROCEDURE — 99291 CRITICAL CARE FIRST HOUR: CPT

## 2017-12-20 RX ORDER — HYDROMORPHONE HYDROCHLORIDE 2 MG/ML
1 INJECTION INTRAMUSCULAR; INTRAVENOUS; SUBCUTANEOUS
Qty: 0 | Refills: 0 | COMMUNITY
Start: 2017-12-20

## 2017-12-20 RX ORDER — MORPHINE SULFATE 50 MG/1
2 CAPSULE, EXTENDED RELEASE ORAL ONCE
Qty: 0 | Refills: 0 | Status: DISCONTINUED | OUTPATIENT
Start: 2017-12-20 | End: 2017-12-20

## 2017-12-20 RX ORDER — MORPHINE SULFATE 50 MG/1
4 CAPSULE, EXTENDED RELEASE ORAL ONCE
Qty: 0 | Refills: 0 | Status: DISCONTINUED | OUTPATIENT
Start: 2017-12-20 | End: 2017-12-20

## 2017-12-20 RX ORDER — POTASSIUM CHLORIDE 20 MEQ
40 PACKET (EA) ORAL
Qty: 0 | Refills: 0 | COMMUNITY

## 2017-12-20 RX ORDER — VANCOMYCIN HCL 1 G
1000 VIAL (EA) INTRAVENOUS ONCE
Qty: 0 | Refills: 0 | Status: COMPLETED | OUTPATIENT
Start: 2017-12-20 | End: 2017-12-20

## 2017-12-20 RX ORDER — ACETAMINOPHEN 500 MG
650 TABLET ORAL ONCE
Qty: 0 | Refills: 0 | Status: COMPLETED | OUTPATIENT
Start: 2017-12-20 | End: 2017-12-20

## 2017-12-20 RX ORDER — SODIUM CHLORIDE 9 MG/ML
3 INJECTION INTRAMUSCULAR; INTRAVENOUS; SUBCUTANEOUS ONCE
Qty: 0 | Refills: 0 | Status: COMPLETED | OUTPATIENT
Start: 2017-12-20 | End: 2017-12-20

## 2017-12-20 RX ORDER — PIPERACILLIN AND TAZOBACTAM 4; .5 G/20ML; G/20ML
2.25 INJECTION, POWDER, LYOPHILIZED, FOR SOLUTION INTRAVENOUS ONCE
Qty: 0 | Refills: 0 | Status: COMPLETED | OUTPATIENT
Start: 2017-12-20 | End: 2017-12-20

## 2017-12-20 RX ORDER — HYDROMORPHONE HYDROCHLORIDE 2 MG/ML
0.2 INJECTION INTRAMUSCULAR; INTRAVENOUS; SUBCUTANEOUS
Qty: 0 | Refills: 0 | Status: DISCONTINUED | OUTPATIENT
Start: 2017-12-20 | End: 2017-12-20

## 2017-12-20 RX ORDER — SODIUM CHLORIDE 9 MG/ML
1000 INJECTION INTRAMUSCULAR; INTRAVENOUS; SUBCUTANEOUS ONCE
Qty: 0 | Refills: 0 | Status: COMPLETED | OUTPATIENT
Start: 2017-12-20 | End: 2017-12-20

## 2017-12-20 RX ORDER — HYDROMORPHONE HYDROCHLORIDE 2 MG/ML
0.4 INJECTION INTRAMUSCULAR; INTRAVENOUS; SUBCUTANEOUS
Qty: 0 | Refills: 0 | Status: DISCONTINUED | OUTPATIENT
Start: 2017-12-20 | End: 2017-12-20

## 2017-12-20 RX ADMIN — MORPHINE SULFATE 2 MILLIGRAM(S): 50 CAPSULE, EXTENDED RELEASE ORAL at 12:11

## 2017-12-20 RX ADMIN — Medication 250 MILLIGRAM(S): at 12:11

## 2017-12-20 RX ADMIN — SODIUM CHLORIDE 2000 MILLILITER(S): 9 INJECTION INTRAMUSCULAR; INTRAVENOUS; SUBCUTANEOUS at 12:11

## 2017-12-20 RX ADMIN — SODIUM CHLORIDE 3 MILLILITER(S): 9 INJECTION INTRAMUSCULAR; INTRAVENOUS; SUBCUTANEOUS at 12:11

## 2017-12-20 RX ADMIN — Medication 650 MILLIGRAM(S): at 12:04

## 2017-12-20 RX ADMIN — HYDROMORPHONE HYDROCHLORIDE 0.2 MILLIGRAM(S): 2 INJECTION INTRAMUSCULAR; INTRAVENOUS; SUBCUTANEOUS at 15:45

## 2017-12-20 RX ADMIN — MORPHINE SULFATE 2 MILLIGRAM(S): 50 CAPSULE, EXTENDED RELEASE ORAL at 12:10

## 2017-12-20 RX ADMIN — PIPERACILLIN AND TAZOBACTAM 200 GRAM(S): 4; .5 INJECTION, POWDER, LYOPHILIZED, FOR SOLUTION INTRAVENOUS at 12:11

## 2017-12-20 RX ADMIN — MORPHINE SULFATE 4 MILLIGRAM(S): 50 CAPSULE, EXTENDED RELEASE ORAL at 12:16

## 2017-12-20 RX ADMIN — MORPHINE SULFATE 4 MILLIGRAM(S): 50 CAPSULE, EXTENDED RELEASE ORAL at 15:05

## 2017-12-20 NOTE — CONSULT NOTE ADULT - PROBLEM SELECTOR RECOMMENDATION 4
Advance care planning meeting  Start time: 1:25pm  End time: 1:42pm  Total time: 16min  A face to face meeting to discuss advance care planning was held today regarding: SHARRON TOBIN  Primary decision maker:  Romy Castellano [phone]   Alternate/surrogate: Patient's sister  Discussed advance directives including, but not limited to, healthcare proxy and code status.  Decision regarding code status: DNR/DNI  Documentation completed today: MUNA

## 2017-12-20 NOTE — DISCHARGE NOTE ADULT - MEDICATION SUMMARY - MEDICATIONS TO STOP TAKING
I will STOP taking the medications listed below when I get home from the hospital:    metoprolol tartrate 100 mg oral tablet  -- 1 tab(s) by mouth 2 times a day    aspirin 81 mg oral tablet  -- 1 tab(s) by mouth once a day    HumaLOG 100 units/mL subcutaneous solution  -- 4 unit(s) subcutaneous 3 times a day (with meals)    Levemir  -- 10 international unit(s) subcutaneous once a day    Levemir 100 units/mL subcutaneous solution  -- 35 unit(s) subcutaneous once a day (at bedtime)    Vitamin D3 400 intl units oral tablet  -- 1 tab(s) by mouth once a day    amLODIPine 5 mg oral tablet  -- 1 tab(s) by mouth once a day    atorvastatin 10 mg oral tablet  -- 1 tab(s) by mouth once a day (at bedtime)    acetaminophen 325 mg oral tablet  -- 2 tab(s) by mouth every 6 hours, As needed, For Temp greater than 38 C (100.4 F)    cloNIDine 0.1 mg/24 hr transdermal film, extended release  -- 1 patch by transdermal patch every 7 days    hydrALAZINE 10 mg oral tablet  -- 1 tab(s) by mouth 2 times a day    potassium chloride 20 mEq oral powder for reconstitution  -- 40 milliequivalent(s) by mouth once a day

## 2017-12-20 NOTE — CONSULT NOTE ADULT - ASSESSMENT
83 year old man with end stage dementia currently with respiratory distress likely secondary to aspiration pneumonia. Known to the palliative inpatient consult services. Patient was under hospice care at [facility] since last admission. Consult for GOC.

## 2017-12-20 NOTE — H&P ADULT - HISTORY OF PRESENT ILLNESS
82 yo M with Hx of advanced dementia, dysphagia receiving comfort feeds, iron deficiency 2/2 blood loss, hypothyroidism, seizure disorder recently here for aspiration pneumonia presents with shortness of breath. Patient was BIBEMS from [facility] when he was found to be dyspneic and hypoxic. Patient is unable to give a history 2/2 dementia. His daughter and sister were present at the bedside. They describe cough and respiratory distress of unclear duration. In the ED, he was septic, tachypneic and hypoxic. 84 yo M with Hx of advanced dementia, dysphagia receiving comfort feeds, iron deficiency 2/2 blood loss, hypothyroidism, seizure disorder recently here for aspiration pneumonia presents with shortness of breath. Patient was BIBEMS from [facility] when he was found to be dyspneic and hypoxic. Patient is unable to give a history 2/2 dementia. His daughter and sister were present at the bedside. They describe cough and respiratory distress of unclear duration. In the ED, he was septic, tachypneic and hypoxic. He was initially given Abx and IVFs. He remained in respiratory distress and the family expressed that the patient would not want intubation. They expressed a desire to make the patient comfortable and the patient was given morphine for air hunger. Palliative care was called for assistance with goals of care.

## 2017-12-20 NOTE — ED PROVIDER NOTE - PROGRESS NOTE DETAILS
pt doing better on morphine, calmed down. sat is still 89% on non rebreather. daughter confirms DNI, however confused about abx, iv fluids, etc. will page palliative to help with GOC got distressed again, more morphine ordered. palliative care attending in room discussing goc

## 2017-12-20 NOTE — H&P ADULT - PROBLEM SELECTOR PLAN 2
Tachypnea on presentation 2/2 aspiration PNA and severe sepsis. Initially improved in NIPPV, but given goals of care expressed by family in the setting of advanced dementia and recurrent aspiration PNA, NIPPV was discontinued and patient was transitioned to morphine.

## 2017-12-20 NOTE — DISCHARGE NOTE ADULT - MEDICATION SUMMARY - MEDICATIONS TO TAKE
I will START or STAY ON the medications listed below when I get home from the hospital:    HYDROmorphone 0.4 mg/mL-NaCl 0.9% intravenous solution  -- 1 milliliter(s) intravenous every hour, As needed, Dyspnea / Air hunger / Excessive work of breathing / RR>35 / Severe Pain  -- Indication: For Shortness of breath or air hunger    levETIRAcetam 500 mg oral tablet  -- 1 tab(s) by mouth 2 times a day  -- Indication: For Epilepsy    levothyroxine 50 mcg (0.05 mg) oral tablet  -- 1 tab(s) by mouth once a day  -- Indication: For Hypothyroidism

## 2017-12-20 NOTE — H&P ADULT - NSHPPHYSICALEXAM_GEN_ALL_CORE
Constitutional: ill-appearing, comfortable on face mask  Head: Normocephalic, atraumatic.  ENMT: Eyes clear, mucus membranes dry  Respiratory: crackles at R base  Cardiac: RRR no murmur  Vasc: cool. 2+ radial, and dorsalis pedis pulses. no edema.  Abd: Soft, non-tender, non-distended. Normoactive bowel sounds.  MSK: moving all extremities. ROM intact.  Skin: Color appropriate for stated race. No rash or lesions.   Neuro: Awake, alert non verbal, no focal weakness

## 2017-12-20 NOTE — CONSULT NOTE ADULT - PROBLEM SELECTOR RECOMMENDATION 2
Has received dose of Vanco 1g IV x1 and Zosyn 2.25g IV x1.  Ongoing conversation with family regarding role of Abx in the setting of terminal diagnosis.

## 2017-12-20 NOTE — ED PROVIDER NOTE - PHYSICAL EXAMINATION
CONSTITUTIONAL: gasping for air   HEAD: Normocephalic; atraumatic.   EYES:  conjunctiva and sclera clear  ENT: dry mm  NECK: no adenopathy  CARDIOVASCULAR: tachycardic  RESPIRATORY: diffuse crackles, tachypneic with significant effort, belly breathing  GI: Soft; non-distended; non-tender; no palpable organomegaly.   EXT: No cyanosis or edema; N/V intact  SKIN: Normal for age and race; poor turgor, cool extremities  NEURO: grossly unremarkable.   PSYCHOLOGICAL: unable to assess

## 2017-12-20 NOTE — CONSULT NOTE ADULT - PROBLEM SELECTOR RECOMMENDATION 9
Patient was placed on BIPAP initially and transitioned to NRB after 2 doses of MSIR. Currently low 90"s O2 sat on NRB mask. Breathing less labored with opioids.   Recommend DC morphine given TARYN on CKD.  Recommend Dilaudid 0.2mg IVP q1hour PRN Dyspnea / Air hunger / Increased work of breathing / RR>25 / Mod Pain  Recommend Dilaudid 0.4mg IVP q1hour PRN Dyspnea / Air hunger / Excessive work of breathing / RR>35 / Severe Pain

## 2017-12-20 NOTE — H&P ADULT - ASSESSMENT
82 yo M with Hx of advanced dementia, dysphagia receiving comfort feeds, iron deficiency 2/2 blood loss, hypothyroidism, seizure disorder recently here for aspiration pneumonia presents with severe sepsis 2/2 aspiration pneumonia also with hypernatremia, acute on chronic renal failure and transaminitis.

## 2017-12-20 NOTE — DISCHARGE NOTE ADULT - PATIENT PORTAL LINK FT
“You can access the [facility] Patient Portal, offered by [facility], by registering with the following website: [email or web address]”

## 2017-12-20 NOTE — ED PROVIDER NOTE - OBJECTIVE STATEMENT
84 yo M with Hx of advanced dementia, dysphagia receiving comfort feeds, iron deficiency 2/2 blood loss, hypothyroidism, seizure disorder recently here for aspiration pneumonia and dc on po levaquin at the hospice, presents with shortness of breath. Patient was BIBEMS from [facility] where he is technically on hospice (but no MOLST, no advance directives done), when he was found to be dyspneic and hypoxic. Patient is unable to give a history 2/2 dementia. Pt very distressed, hypoxic to 80s on non rebreather. Called pts daughter and hcp immediately who stated no intubation, but was unsure of whether to make comfort care or not, so decision made to place line, labs, iv fluids and abx until they could come in and discuss further. unclear when started.

## 2017-12-20 NOTE — H&P ADULT - PROBLEM SELECTOR PLAN 3
Suspect pre-renal in the setting of severe sepsis.  -IVFs given in the ED. Given goals of care, will not continue to draw labs.

## 2017-12-20 NOTE — H&P ADULT - PROBLEM SELECTOR PLAN 1
Severe sepsis 2/2 aspiration PNA. Lactate 6.0. leukocytosis with neutrophilia and RLL infiltrate.  -Abx and IVFs given in ED.  -Lactate not repeated and 30cc/kg fluid resuscitation not completed 2/2 family desire to make the patient comfort care and transfer to inpatient hospice unit for end of life care in the setting of end stage dementia.  -Palliative care following. Thank you for your assistance.

## 2017-12-20 NOTE — CONSULT NOTE ADULT - SUBJECTIVE AND OBJECTIVE BOX
SHARRON TOBIN          MRN-[id number]            (10/18/1934)    HPI: The patient is a 83y Male complaining of shortness of breath arrived from [facility] where he was a long term resident with [facility][1] hospice services. Presently found to have sepsis likely secondary to aspiration PNA of RLL. Patient sister and daughter at bedside. Currently ongoing conversations regarding plan of care.    PAST MEDICAL & SURGICAL HISTORY:  Hypokalemia  Vitamin D deficiency  Muscle weakness (generalized)  UTI (urinary tract infection)  Epilepsy  Hypothyroidism  Dysphagia  HLD (hyperlipidemia)  Dementia  Chronic kidney disease: Chronic kidney disease (CKD)  Obstructive hydrocephalus: Hydrocephalus  Essential hypertension: HTN (hypertension)  Type 2 diabetes mellitus: DM (diabetes mellitus)  History of suprapubic catheter: present    FAMILY HISTORY: Reviewed and found non contributory to acute issues.    SOCIAL HISTORY: . Was living in [facility] as a long term resident. [employer]/Medicaid/ [employer] . Adult daughters and sister involved in his care.    ROS:    Unable to attain due to:  Medical condition / End stage dementia                    Dyspnea (Jordan 0-10):  5                      N/V (Y/N): No                             Secretions (Y/N) : No                Agitation(Y/N): Yes   Pain (Y/N): PAINAD scale: 3      -Provocation/Palliation: Unable to assess  -Quality/Quantity: Unable to assess  -Radiating: Unable to assess  -Severity:  Mild  -Timing/Frequency: Unable to assess  -Impact on ADLs: Unable to assess    Allergies: No Known Allergies or Intolerances    Opiate Naive (Y/N): Yes  -iStop reviewed (Y/N): Yes. No Rx found on iStop review (Ref#: [id number])    Medications:      MEDICATIONS  (STANDING):  morphine  - Injectable 4 milliGRAM(s) IV Push once  sodium chloride 0.9% Bolus 1000 milliLiter(s) IV Bolus once    MEDICATIONS  (PRN):    Labs:    CBC:                        12.3   15.0  )-----------( 514      ( 20 Dec 2017 11:54 )             37.9     CMP:    12-20    165<HH>  |  125<H>  |  58<H>  ----------------------------<  234<H>  3.9   |  19<L>  |  3.77<H>    Ca    10.0      20 Dec 2017 11:54  TPro  8.7<H>  /  Alb  2.6<L>  /  TBili  0.5  /  DBili  x   /  AST  63<H>  /  ALT  46<H>  /  AlkPhos  170<H>  12-    Lactate, Blood: 6.0: TYPE:(C=Critical, N=Notification, A=Abnormal) C  TESTS: LACTATE  DATE/TIME CALLED: 17 11:58  CALLED TO: NAV RODNEY RN  READ BACK (2 Patient Identifiers)(Y/N): Y  READ BACK VALUES (Y/N): Y  CALLED BY: WY mmoL/L (17 @ 11:53)    Imaging:  Reviewed xray    PEx:  T(C): 38.2 (17 @ 11:57), Max: 38.2 (17 @ 11:57)  HR: 98 (17 @ 12:22) (71 - 125)  BP: 100/62 (17 @ 12:22) (100/62 - 145/88)  RR: 20 (17 @ 12:22) (20 - 35)  SpO2: 92% (17 @ 12:22) (84% - 98%)  Weight: 60kg    General: Elderly man laying in bed in moderate respiratory distress.   HEENT:  Tracking Temporal wasting PERRL Dry lips and mucosas. Nasal bridge bandage. NRB mask   Neck: Secretions auscultated No masses  CVS: Tachycardic S1S2  Resp: Tachypnea improved as well as work of breathing after MSIR doses. Currently unlabored.  GI:  Soft NT ND BS+  : Abraham+    Musc: No cyanosis or edema. Contracted/knees pulled up.    Neuro: Not following commands   Psych: Agitated  Skin: Intact. Dystrophic toenails  Lymph: Normal  Preadmit Karnofsky: 40%           Current Karnofsky:  20%  Cachexia (Y/N): Yes    Advanced Directives:     DNR/DNI, comfort measures     HCP    Decision maker: The patient does not demonstrate capacity for complex medical decision making given medical condition.  Legal surrogate: Daughters Romy Castellano [phone] and Nick Tobin [phone] are the named HCP agents. Copy of HCP found in [facility], copy placed in paper chart.     GOALS OF CARE DISCUSSION       Palliative care info/counseling provided	           Family meeting performed at bedside in the ED       Advanced Directives addressed please see Advance Care Planning Note Below	           See previous Palliative Medicine Note       Documentation of GOC: DNR / DNI comfort          AGENCIES DISCUSSED       [facility][1[facility]s [facility] inpatient hospice unit     REFERRALS	        Palliative Med        Unit SW/Case Mgmt       Hospice - [facility][1]

## 2017-12-20 NOTE — CONSULT NOTE ADULT - PROBLEM SELECTOR RECOMMENDATION 6
Support provided to patient and family. Patient to have access to supportive services during rest of hospital stay as the patient/family deemed necessary ie. Chaplaincy, Massage therapy, Music therapy, Patient and family supportive services, Palliative SW, etc.    As discussed during the palliative IDT meeting and as identified during the patients PSSA screening the patient would benefit from palliative SW, patient and family support, and Chaplaincy.

## 2017-12-20 NOTE — ED PROVIDER NOTE - MEDICAL DECISION MAKING DETAILS
here w/ respiratory failure, likely 2/2 worsening pnuemonia, thought to be aspiration in nature. long GOC conversation had with family, and palliative care consulted from the ED. Plan for comfort care and transfer to inpatient hospice at . morphine given for air hunger to good effect.

## 2017-12-20 NOTE — DISCHARGE NOTE ADULT - SECONDARY DIAGNOSIS.
Respiratory failure, unspecified chronicity, unspecified whether with hypoxia or hypercapnia Severe sepsis Hypothyroidism Essential hypertension Epilepsy Acute on chronic kidney failure

## 2017-12-20 NOTE — ED ADULT NURSE NOTE - OBJECTIVE STATEMENT
Presents from nursing home/hospice with altered MS and respiratory distress. Pt with recent history of PNA.

## 2017-12-20 NOTE — ED PROVIDER NOTE - CARE PLAN
Principal Discharge DX:	Pneumonia of right lower lobe due to infectious organism  Secondary Diagnosis:	Respiratory failure, unspecified chronicity, unspecified whether with hypoxia or hypercapnia

## 2017-12-20 NOTE — ED ADULT NURSE REASSESSMENT NOTE - NS ED NURSE REASSESS COMMENT FT1
Pt family at bedside. Pt appears comfortable at this time. Plan for transfer to palliative care service.

## 2017-12-20 NOTE — H&P ADULT - NSHPLABSRESULTS_GEN_ALL_CORE
LABS: REVIEWED                        12.3   15.0  )-----------( 514      ( 20 Dec 2017 11:54 )             37.9     12-    165<HH>  |  125<H>  |  58<H>  ----------------------------<  234<H>  3.9   |  19<L>  |  3.77<H>    Ca    10.0      20 Dec 2017 11:54    TPro  8.7<H>  /  Alb  2.6<L>  /  TBili  0.5  /  DBili  x   /  AST  63<H>  /  ALT  46<H>  /  AlkPhos  170<H>        Urinalysis Basic - ( 20 Dec 2017 14:01 )    Color: Yellow / Appearance: Cloudy / S.015 / pH: x  Gluc: x / Ketone: NEGATIVE  / Bili: Negative / Urobili: 0.2 E.U./dL   Blood: x / Protein: 100 mg/dL / Nitrite: NEGATIVE   Leuk Esterase: Moderate / RBC: < 5 /HPF / WBC 5-10 /HPF   Sq Epi: x / Non Sq Epi: 0-5 /HPF / Bacteria: Present /HPF      Lactate, Blood: 6.0 mmoL/L ( @ 11:53)      RADIOLOGY, EKG & ADDITIONAL TESTS: REVIEWED  < from: Xray Chest 1 View AP/PA (17 @ 12:01) >    EXAM:  XR CHEST-FRONTAL                          PROCEDURE DATE:  2017                     INTERPRETATION:  TIME OF STUDY: 2017 12:01 PM    CLINICAL INFORMATION:  Pneumonia.    PRIORS: Chest radiograph dated 12/10/2017.    FINDINGS: Single frontal view the chest was obtained. Patchy opacities   are seen in the right lower lung field suspicious for an infiltrative   process such as pneumonia. No pneumothorax or pleural effusions. The   cardiac and mediastinal silhouettes are unremarkable. No acute osseous   abnormality is seen.      Impression: Patchy opacities in the right lower lung field suspicious for   an infiltrative process such as pneumonia.            "Thank you for the opportunity to participate in the care of this   patient."    CAMILLA PINON M.D., RADIOLOGY RESIDENT  This document has been electronically signed.  TERRA RIVERA M.D., ATTENDING RADIOLOGIST  This document has been electronically signed. Dec 20 2017  5:10PM    < end of copied text >

## 2017-12-20 NOTE — CONSULT NOTE ADULT - PROBLEM SELECTOR RECOMMENDATION 5
Family agreed to inpatient hospice care at [facility] under [facility] for full comfort measures and likely end of life care. Palliative SW discussed with [facility] liaison and patient has been accepted medically to the inpatient hospice unit, currently awaiting a bed.

## 2017-12-20 NOTE — DISCHARGE NOTE ADULT - PLAN OF CARE
comfort measures Patient was admitted for severe sepsis 2/2 aspiration PNA associated with respiratory failure. Patient initially received IVF and Abx, but following Palliative care discussion the patient was made comfort care and discharged to inpatient hospice. C/w dilaudid 0.2mg IVP for air hunger/shortness of breath of moderate pain. C/w dilaudid 0.4mg IVP for severe pain or shortness of breath /air hunger not managed on 0.2mg dose. as above C/w levothyroxine 50mcg qAM Hold antihypertensive in the setting of sepsis. Continue with keppra 500mg BID no additional intervention given goals of care

## 2017-12-20 NOTE — ED PROVIDER NOTE - CRITICAL CARE PROVIDED
conducted a detailed discussion of DNR status/consult w/ pt's family directly relating to pts condition/telephone consultation with the patient's family/direct patient care (not related to procedure)/interpretation of diagnostic studies/additional history taking/consultation with other physicians/documentation

## 2017-12-20 NOTE — DISCHARGE NOTE ADULT - HOSPITAL COURSE
82 yo M with Hx of advanced dementia, dysphagia receiving comfort feeds, iron deficiency 2/2 blood loss, hypothyroidism, seizure disorder recently here for aspiration pneumonia presents with shortness of breath. Patient was BIBEMS from [facility] when he was found to be dyspneic and hypoxic. On presentation to the ED he was in respiratory distress and was found to have severe sepsis 2/2 Aspiration PNA complicated by respiratory failure. He was initially given Abx and IVFs and started on BiPAP, which he did not tolerate. He remained in respiratory distress and the family expressed that the patient would not want intubation or other invasive interventions. They expressed a desire to make the patient comfortable and the patient was given morphine for air hunger. Palliative care was consulted and assisted with goals of care discussion. Following extensive discussion, consensus among the family was the discontinue aggressive interventions and to provide only symptomatic comfort care. Arrangements were made to transfer the patient to the inpatient hospice care unit at [facility]. Family agreed to transfer to this facility. The patient was optimized for transfer prior to discharge.

## 2017-12-20 NOTE — DISCHARGE NOTE ADULT - CARE PLAN
Principal Discharge DX:	Pneumonia of right lower lobe due to infectious organism  Goal:	comfort measures  Instructions for follow-up, activity and diet:	Patient was admitted for severe sepsis 2/2 aspiration PNA associated with respiratory failure. Patient initially received IVF and Abx, but following Palliative care discussion the patient was made comfort care and discharged to inpatient hospice. C/w dilaudid 0.2mg IVP for air hunger/shortness of breath of moderate pain. C/w dilaudid 0.4mg IVP for severe pain or shortness of breath /air hunger not managed on 0.2mg dose.  Secondary Diagnosis:	Respiratory failure, unspecified chronicity, unspecified whether with hypoxia or hypercapnia  Instructions for follow-up, activity and diet:	as above  Secondary Diagnosis:	Severe sepsis  Instructions for follow-up, activity and diet:	as above  Secondary Diagnosis:	Hypothyroidism  Instructions for follow-up, activity and diet:	C/w levothyroxine 50mcg qAM  Secondary Diagnosis:	Essential hypertension  Instructions for follow-up, activity and diet:	Hold antihypertensive in the setting of sepsis.  Secondary Diagnosis:	Epilepsy  Instructions for follow-up, activity and diet:	Continue with keppra 500mg BID  Secondary Diagnosis:	Acute on chronic kidney failure  Instructions for follow-up, activity and diet:	no additional intervention given goals of care

## 2017-12-22 LAB
CULTURE RESULTS: SIGNIFICANT CHANGE UP
SPECIMEN SOURCE: SIGNIFICANT CHANGE UP

## 2017-12-25 LAB
CULTURE RESULTS: SIGNIFICANT CHANGE UP
CULTURE RESULTS: SIGNIFICANT CHANGE UP
SPECIMEN SOURCE: SIGNIFICANT CHANGE UP
SPECIMEN SOURCE: SIGNIFICANT CHANGE UP

## 2018-12-06 NOTE — ED ADULT TRIAGE NOTE - NS ED NURSE AMBULANCES
[facility] Scribe Attestation (For Scribes USE Only)... Attending Attestation (For Attendings USE Only).../Scribe Attestation (For Scribes USE Only)...

## 2020-06-15 NOTE — ED ADULT TRIAGE NOTE - NS ED TRIAGE CLINICAL UPGRADE
Addended by: Chuck Shen on: 6/15/2020 11:19 AM     Modules accepted: Orders Deteriorating patient status - Patient was clinically upgraded due to deteriorating patient status.

## 2022-06-06 NOTE — H&P ADULT - PROBLEM SELECTOR PLAN 7
pt w/ hx of obstructive hydrocephalus and dementia who is AAOx0 at baseline.   -stable Mercedes Flap Text: The defect edges were debeveled with a #15 scalpel blade.  Given the location of the defect, shape of the defect and the proximity to free margins a Mercedes flap was deemed most appropriate.  Using a sterile surgical marker, an appropriate advancement flap was drawn incorporating the defect and placing the expected incisions within the relaxed skin tension lines where possible. The area thus outlined was incised deep to adipose tissue with a #15 scalpel blade.  The skin margins were undermined to an appropriate distance in all directions utilizing iris scissors.

## 2025-06-29 NOTE — ED ADULT NURSE NOTE - NURSING NEURO ORIENTATION
"Pt reports he would like to see a \"representative\". When clarified, states he wants to see a  because he his homeless  "
disoriented to place/disoriented to time/situation/disoriented to person